# Patient Record
Sex: FEMALE
[De-identification: names, ages, dates, MRNs, and addresses within clinical notes are randomized per-mention and may not be internally consistent; named-entity substitution may affect disease eponyms.]

---

## 2018-12-06 NOTE — RAD
RADIOGRAPH CHEST 1 VIEW:

 

Date:  12/06/18 

Time:  1844 hours

 

HISTORY: 

62-year-old female with chest pain. 

 

FINDINGS:

There is cardiomegaly. There is no evidence of air space density, pulmonary edema, or pneumothorax. T
he lateral costophrenic angles are sharp.

 

There is no interval change in the intrathoracic contents compared to 04/05/18. 

 

IMPRESSION:

1.  No acute pulmonary findings.

2.  Cardiomegaly without congestive heart failure.

 

 

katherine [] 

 

POS: [facility]

## 2018-12-06 NOTE — PDOC.FPRHP
- History of Present Illness


Chief Complaint: Chest pain 


History of Present Illness: 





Ms. Russell presents to the ED with CP beginning 30 mins PTA. 





She reports that she was folding laundry when she began to feel a pulling 

sensation in her left chest area and arm pain. She has had pulling/cramping 

sensations in her chest before. She denies worsening shortness of breath or ANTUNEZ

, palpitations, new weakness/tingling, headache or vision changes. She had 

dialysis as scheduled today. She has seen Dr. Nova in the past without 

any positive stress or echos, she reports all of those tests were done approx 3-

4 years ago. 


ED Course: 





CBC, CMP, Trop, BNP, Lipase, CK, CXR, EKG 


Protonix, ASA, duoneb





- Allergies/Adverse Reactions


 Allergies











Allergy/AdvReac Type Severity Reaction Status Date / Time


 


adhesive Allergy  Rash Verified 12/07/18 02:04


 


Latex, Natural Rubber Allergy  Rash Verified 12/07/18 02:04


 


levofloxacin [From Levaquin] Allergy  Rash Verified 12/07/18 02:04


 


soap [From Betadine] Allergy  Rash Verified 12/07/18 02:04














- Home Medications


 











 Medication  Instructions  Recorded  Confirmed  Type


 


Atorvastatin Calcium [Lipitor] 80 mg PO HS 04/05/18 12/07/18 History


 


Clopidogrel Bisulfate [Plavix] 75 mg PO DAILY 04/05/18 12/07/18 History


 


Cyanocobalamin (Vitamin B-12) 1,000 mcg PO DAILY 04/05/18 12/07/18 History





[Vitamin B-12]    


 


Midodrine HCl [ProAmatine] 5 mg PO Q2DAYS 04/05/18 12/07/18 History


 


Montelukast Sodium [Singulair] 10 mg PO DAILY 04/05/18 12/07/18 History


 


Nortriptyline HCl [Pamelor] 50 mg PO HS PRN 04/05/18 12/07/18 History


 


Pregabalin [Lyrica] 50 mg PO TID 04/05/18 12/07/18 History


 


Primidone [Mysoline] 50 mg PO TID 04/05/18 12/07/18 History


 


predniSONE [Prednisone] 2.5 mg PO QAM 04/05/18 12/07/18 History


 


Budesonide 0.5 mg IH BID 08/30/18 12/07/18 History


 


Calcium Acetate 667 mg PO TID-WM 08/30/18 12/07/18 History


 


Acetaminophen [Tylenol Regular 650 mg PO Q4H PRN  tab 12/07/18  Rx





Strength]    


 


Budesonide-Formoterol [Symbicort 2 puff INH BID 12/07/18 12/07/18 History





160-4.5]    


 


Celecoxib 200 mg PO BID 12/07/18 12/07/18 History


 


Fluticasone Propionate [Flonase 1 spray EA NARE HS 12/07/18 12/07/18 History





Nasal Spray]    


 


Folic Acid 1 mg PO DAILY 12/07/18 12/07/18 History


 


Ipratropium/Albuterol Sulfate 3 ml NEB BID PRN 12/07/18 12/07/18 History


 


Lanthanum Carbonate [Fosrenol] 1,000 mg PO TID-WM 12/07/18 12/07/18 History














- History


PMHx: ESRD on dialysis, HTN, GERD, asthma 


 


PSHx: appy, choley, hernia repairx2, spleenectomy 





FHx: CA, DMII


 


Social: no TAD, nephew at bedside 


 








- Review of Systems


General: reports: fatigue.  denies: fever/chills, weight/appetite/sleep changes


Eyes: denies: eye pain, vision changes


ENT: denies: nasal congestion


Respiratory: reports: shortness of breath, exercise intolerance.  denies: cough

, congestion


Cardiovascular: reports: chest pain.  denies: palpitation, edema


Gastrointestinal: reports: constipation.  denies: nausea, vomiting, diarrhea


Skin: denies: rashes, lesions


Musculoskeletal: denies: pain, tenderness


Neurological: denies: numbness, syncope





- Vital signs


BP: 120/91  HR: 91 RR: 18 Tmax: 98.5 Pox: 97% on RA  Wt: 121.50   








- Physical Exam


Constitutional: NAD, awake, alert and oriented


HEENT: grossly normal vision, grossly normal hearing, MMM


Neck: supple, trachea midline


Chest: no lesions, other (tender to palpation over left chest)


Heart: RRR, normal S1/S2, no murmurs/rubs/gallops, pulses present, no edema, 

other (difficult to auscultate)


Lungs: CTAB, no respiratory distress, good air movement


Abdomen: soft, non-tender


Musculoskeletal: normal structure, normal tone


Neurological: no focal deficit


Skin: no rash/lesions, good turgor


Heme/Lymphatic: no unusual bruising or bleeding


Psychiatric: normal mood and affect





FMR H&P: Results





- Labs


Result Diagrams: 


 12/07/18 03:34





 12/07/18 03:34


Lab results: 


 











WBC  13.1 thou/uL (4.8-10.8)  H  12/06/18  18:58    


 


Hgb  15.9 g/dL (12.0-16.0)   12/06/18  18:58    


 


Hct  47.7 % (36.0-47.0)  H  12/06/18  18:58    


 


MCV  101.0 fL (78.0-98.0)  H  12/06/18  18:58    


 


Plt Count  365 thou/uL (130-400)   12/06/18  18:58    


 


Neutrophils %  66.7 % (42.0-75.0)   12/06/18  18:58    


 


Sodium  141 mmol/L (136-145)   12/06/18  18:58    


 


Potassium  4.3 mmol/L (3.5-5.1)   12/06/18  18:58    


 


Chloride  97 mmol/L ()  L  12/06/18  18:58    


 


Carbon Dioxide  32 mmol/L (23-31)  H  12/06/18  18:58    


 


BUN  19 mg/dL (9.8-20.1)   12/06/18  18:58    


 


Creatinine  4.44 mg/dL (0.6-1.1)  H  12/06/18  18:58    


 


Glucose  111 mg/dL ()   12/06/18  18:58    


 


Calcium  10.3 mg/dL (7.8-10.44)   12/06/18  18:58    


 


Total Bilirubin  0.5 mg/dL (0.2-1.2)   12/06/18  18:58    


 


AST  18 U/L (5-34)   12/06/18  18:58    


 


ALT  18 U/L (8-55)   12/06/18  18:58    


 


Alkaline Phosphatase  227 U/L ()  H  12/06/18  18:58    


 


Creatine Kinase  138 U/L ()   12/06/18  18:58    


 


Serum Total Protein  8.1 g/dL (6.0-8.3)   12/06/18  18:58    


 


Albumin  4.1 g/dL (3.4-4.8)   12/06/18  18:58    


 


Lipase  57 U/L (8-78)   12/06/18  18:58    














FMR H&P: A/P





- Problem List


(1) Atypical chest pain


Status: Resolved   Code(s): R07.89 - OTHER CHEST PAIN   





(2) Chronic obstructive asthma with exacerbation


Status: Acute   Code(s): J44.1 - CHRONIC OBSTRUCTIVE PULMONARY DISEASE W (ACUTE

) EXACERBATION; J45.901 - UNSPECIFIED ASTHMA WITH (ACUTE) EXACERBATION   





(3) ESRD (end stage renal disease) on dialysis


Status: Chronic   Code(s): N18.6 - END STAGE RENAL DISEASE; Z99.2 - DEPENDENCE 

ON RENAL DIALYSIS   





(4) Peripheral neuropathy


Status: Acute   Code(s): G62.9 - POLYNEUROPATHY, UNSPECIFIED   





(5) Physical deconditioning


Status: Acute   Code(s): R53.81 - OTHER MALAISE   





(6) Gastroesophageal reflux disease


Status: Chronic   Code(s): K21.9 - GASTRO-ESOPHAGEAL REFLUX DISEASE WITHOUT 

ESOPHAGITIS   


Qualifiers: 


   Esophagitis presence: esophagitis presence not specified   Qualified Code(s)

: K21.9 - Gastro-esophageal reflux disease without esophagitis   





(7) Hyperlipidemia


Status: Chronic   Code(s): E78.5 - HYPERLIPIDEMIA, UNSPECIFIED   





- Plan





Atypical chest pain 


- reproducible, tugging sensation, heart score 4 


- initial troponin neg, trendx3


- TTE and Stress in AM 


- NPO after midnight


Asthma


- possible cause of pain, continue home meds


- duonebs q4hr PRN


ESRD on dialysis


- nephrology consulted, appreciate Recs


- AM CMP 


peripheral neuropathy 


- continue home meds


GERD


- continue home meds


obesity hypoventilation syndrome


- aware, O2 monitoring, keep sats>88%


physical deconditioning


- aware, turn frequently 





Code: full 


ppx: [facility] 


Disposition/LOS: 





monitor on tele, stress/echo in AM 





FMR H&P: [place]





- Pertinent history





I agree completely with intern history. Only to add pt's chest pain resolved 

upon my examination. 





- Plan


Date/Time: 12/06/18 1957








I, Eddy Mota, have evaluated this patient and agree with findings/plan as 

outlined by intern resident. Pertinent changes/additions are listed here.








1. Atypical chest pain - Negative troponins, but concerning T-wave inversion in 

ED. Pain resolved with PPI and also reproducible on L chest. 


2. Asthma -possible cause of pain, continue home meds; duonebs q4hr PRN


3. ESRD on dialysis - Dr Cleveland nephrology consulted; did have HD today w/o 

complication. 


4. GERD - Pain did resolve with PPI. Continue PPI and avoid greasy/spicy foods. 


5. RACHID/Obesity hypoventilation syndrome; O2 monitoring, goal sats >88%


6. Physical deconditioning


7. Peripheral neuropathy - Continue Lyrica


8. HLD: Continue home meds





Attending Addendum





- Attending Addendum


Date/Time: 12/10/18 1023





I personally evaluated the patient and discussed the management with Dr. King 

at time of admission.


I agree with the History, Examination, Assessment and Plan documented above 

with any addition or exceptions noted below.

## 2018-12-06 NOTE — PDOC.EVN
Event Note





- Event Note


Event Note: 





Date/Time: 12/06/18 0940





I personally evaluated the patient and discussed the management with Dr. King.  

H&P pending.


I agree with the History, Examination, Assessment and Plan as discussed.

## 2018-12-07 NOTE — PDOC.FM
- Subjective


Subjective: 


Patient resting comfortably in bed. No overnight events. Patient states that 

she has not had a recurrence of chest pain. Patient denies SOB, diaphoresis, NVD

, fever/chills.





- Objective


Vital Signs & Weight: 


 Vital Signs (12 hours)











  Temp Pulse Resp BP Pulse Ox


 


 12/07/18 01:48  98 F  93  20  128/58 L  93 L








 Weight











Weight                         122.379 kg














I&O: 


 











 12/05/18 12/06/18 12/07/18





 06:59 06:59 06:59


 


Intake Total   0


 


Output Total   0


 


Balance   0











Result Diagrams: 


 12/07/18 03:34





 12/07/18 03:34





<Hillary Pardo - Last Filed: 12/07/18 08:06>





- Objective


Vital Signs & Weight: 


 Vital Signs (12 hours)











  Temp Pulse Resp BP Pulse Ox


 


 12/07/18 07:45  98.0 F  84  22 H  132/63  91 L


 


 12/07/18 06:55      91 L


 


 12/07/18 06:51   76  20  


 


 12/07/18 06:30   76  20  


 


 12/07/18 01:48  98 F  93  20  128/58 L  93 L








 Weight











Weight                         122.379 kg














I&O: 


 











 12/06/18 12/07/18 12/08/18





 06:59 06:59 06:59


 


Intake Total  0 


 


Output Total  0 


 


Balance  0 











Result Diagrams: 


 12/07/18 03:34





 12/07/18 03:34





<Lucio Riggs - Last Filed: 12/07/18 10:41>





Phys Exam





- Physical Examination


Constitutional: NAD


morbidly obese female


HEENT: PERRLA, moist MMs, sclera anicteric


Neck: supple, full ROM


Respiratory: clear to auscultation bilateral


Cardiovascular: RRR


chest non TTP


Gastrointestinal: soft, non-tender, positive bowel sounds


Musculoskeletal: no edema, pulses present


Neurological: non-focal


Psychiatric: normal affect, A&O x 3


Skin: no rash





<Hillary Pardo - Last Filed: 12/07/18 08:06>





Dx/Plan


(1) BMI 50.0-59.9, adult


Code(s): Z68.43 - BODY MASS INDEX (BMI) 50-59.9, ADULT   Status: Chronic   





(2) Benign hypertension


Code(s): I10 - ESSENTIAL (PRIMARY) HYPERTENSION   Status: Chronic   





(3) ESRD (end stage renal disease) on dialysis


Code(s): N18.6 - END STAGE RENAL DISEASE; Z99.2 - DEPENDENCE ON RENAL DIALYSIS 

  Status: Chronic   





(4) Gastroesophageal reflux disease


Code(s): K21.9 - GASTRO-ESOPHAGEAL REFLUX DISEASE WITHOUT ESOPHAGITIS   Status: 

Chronic   


Qualifiers: 


   Esophagitis presence: esophagitis presence not specified   Qualified Code(s)

: K21.9 - Gastro-esophageal reflux disease without esophagitis   





(5) History of splenectomy


Code(s): Z90.81 - ACQUIRED ABSENCE OF SPLEEN   Status: Chronic   





(6) Hyperlipidemia


Code(s): E78.5 - HYPERLIPIDEMIA, UNSPECIFIED   Status: Chronic   





(7) RACHID on CPAP


Code(s): G47.33 - OBSTRUCTIVE SLEEP APNEA (ADULT) (PEDIATRIC); Z99.89 - 

DEPENDENCE ON OTHER ENABLING MACHINES AND DEVICES   Status: Chronic   





(8) Atypical chest pain


Code(s): R07.89 - OTHER CHEST PAIN   Status: Resolved   





- Plan


Plan: 


Atypical chest pain, ACS r/o


- reproducible, tugging sensation, heart score 4 


- Trop neg


- TTE and Stress on 12/7


- NPO after midnight





Asthma


- possible cause of pain, continue home meds


- duonebs q4hr PRN





ESRD on dialysis


- nephrology consulted, appreciate Recs


- Cr. on arrival 4.44, on 12/7 5.29


- Pt got HD on 12/6


- AM CMP 





peripheral neuropathy 


- continue home meds





GERD


- continue home meds





obesity hypoventilation syndrome


- aware, O2 monitoring, keep sats>88%





physical deconditioning


- aware, turn frequently 





Code: full 


ppx: [facility] 


Disposition/LOS: 


monitor on tele, stress/echo 12/7 








<Hillary Pardo - Last Filed: 12/07/18 08:06>





Attending Addendum





- Attending Addendum


Date/Time: 12/07/18 1037





I personally evaluated the patient and discussed the management with Dr. Pardo.


I agree with the History, Examination, Assessment and Plan documented above 

with any addition or exceptions noted below.








62F with atypical chest pain here for ACS r/o. She had a negative cardiac work 

up several years ago with Cardiology. No cardiac issues until yesterday's 

symptoms. DM on HD. Stress test and TTE today. F/u results.





<Lucio Riggs - Last Filed: 12/07/18 10:41>

## 2018-12-07 NOTE — NM
MYOCARDIAL PERFUSION SCAN WITH SPECT IMAGING:

 

HISTORY:

Chest pain.

 

FINDINGS:

The examination was performed using 28 millicuries of 99m technetium sestamibi, as a stress only stud
y.

 

FINDINGS:

Fairly normal distribution of the radiopharmaceutical without signs of ischemia or scar.

 

WALL MOTION:

There is symmetric contractility to the ventricle.

 

LEFT VENTRICULAR EJECTION FRACTION:

The calculated left ventricular fracture is 76%.

 

IMPRESSION:

Unremarkable myocardial perfusion scan.

 

POS: [facility]

## 2018-12-09 NOTE — DIS
DATE OF ADMISSION:  12/06/2018



DATE OF DISCHARGE:  12/07/2018



RESIDENT:  Hillary Pardo MD



ADMITTING ATTENDING:  Dr. Madhu Ojeda.



DISCHARGE ATTENDING:  Dr. Lucio Riggs.



CONSULTS:  None.



PROCEDURES:  None.



PRIMARY DIAGNOSIS:  Atypical chest pain.



SECONDARY DIAGNOSES:  

1. Asthma.

2. End-stage renal disease, on dialysis.

3. Peripheral neuropathy.

4. Gastroesophageal reflux disease.

5. Obesity hypoventilation syndrome.

6. Physical deconditioning.



DISCHARGE MEDICATIONS:  

1. Acetaminophen 650 mg oral every 4 hours as needed.

2. Nortriptyline 50 mg oral at bedtime as needed.

3. Midodrine 5 mg oral every other day.

4. Lyrica 50 mg oral three times daily.

5. Montelukast 10 mg oral daily.

6. Cyanocobalamin 1000 mcg oral daily.

7. Plavix 75 mg oral daily.

8. Prednisone 2.5 mg oral every morning.

9. Lipitor 80 mg oral at bedtime.

10. Primidone 50 mg oral three times daily.

11. Budesonide 0.5 mg inhalation twice daily.

12. Calcium acetate 667 mg oral three times daily with meals.

13. Folic acid 1 mg oral daily.

14. Fluticasone propionate one spray each naris at bedtime.

15. Ipratropium/albuterol sulfate 3 mL nebulizer twice daily as needed.

16. Celecoxib 200 mg oral twice daily.

17. Lanthanum carbonate 1000 mg oral three times daily with meals.

18. Symbicort 2 puff inhalation twice daily.



DISCONTINUED MEDICATIONS:  None.



HISTORY OF PRESENT ILLNESS/HOSPITAL COURSE:  This is a 62-year-old female, who

presented to the ED with chest pain beginning 30 minutes prior to arrival while

folding laundry.  She described the pain as a point sensation in her left chest 
area

and arm pain.  The patient also states that she had crawling/cramping 
sensations in

her chest before.  The patient denied shortness of breath, palpitations, 
tingling,

headache, or vision changes.  The patient had dialysis earlier in the day.  The

patient has seen cardiologist, Dr. Ellis in the past without any positive 
stress

or echo.  These tests were done approximately 3 to 4 years ago.  The patient 
had a

HEART score of 4 on arrival.  The patient's troponins were negative x3.  The 
patient

was kept n.p.o. after midnight for stress test.  The patient had a stress test 
that

revealed an unremarkable myocardial perfusion scan.  The patient had an

echocardiogram performed that was unchanged from her previous echo in 2017. 

 The patient denied any other chest pain after leaving the ED.



DISPOSITION:  Stable.



LOCATION:  Home.



DIET:  Heart healthy.



ACTIVITY:  Ad-lety.



FOLLOWUP:  Follow up with PCP within 1 week.







Job ID:  [id number]



MTDD

## 2018-12-12 NOTE — STRESS
Acquisition Time: 2018-12-07  08:51:52

Total Exercise Time: 00:01:00

Test Indications: CHEST PAIN

Medications: 

 

 

 

 

 

 

 

 

 

Protocol:  LEXISCAN

Max HR: 093 BPM  59% of  Pred: 156 BPM

Max BP: 128/086 mmHG

Max Work Load: 1.0 METS

 

RESTING ECG: NORMAL SINUS RHYTHM AT 76 BPM WITH NONSPECIFIC T WAVES

SYMPTOMS: NAUSEA

NORMAL BP RESPONSE

ECTOPY STRESS: NONE

ECG STRESS: NO SIGNIFICANT CHANGES

INTERPRETATION: NEGATIVE ECG / AWAIT NUCLEAR IMAGES FOR DEFINITIVE DIAGNOSIS

 

Confirmed by CHERI MEI M.D. ([id number]) on 12/12/2018 5:42:19 PM

 

Referred By: DO IGNACIO           Confirmed By:CHERI MEI M.D.

## 2018-12-12 NOTE — EKG
Test Reason : 

Blood Pressure : ***/*** mmHG

Vent. Rate : 093 BPM     Atrial Rate : 093 BPM

   P-R Int : 190 ms          QRS Dur : 098 ms

    QT Int : 380 ms       P-R-T Axes : 033 -08 091 degrees

   QTc Int : 472 ms

 

Normal sinus rhythm

Abnormal QRS-T angle, consider primary T wave abnormality



Abnormal ECG

 

Confirmed by JANE RAMIREZ, MAYA COY ([id number]),  BOBBI MURILLO ([id number]) on 12/12/2018 2:41:13 PM

 

Referred By:             Confirmed By:MAYA LARA MD

## 2019-01-17 NOTE — CT
CT PULMONARY ANGIOGRAM WITH IV CONTRAST AND 3D MIP RECONSTRUCTIONS:

 

01/17/2019

 

PROVIDED CLINICAL HISTORY:

Dyspnea.

 

COMPARISON:

07/29/2017

 

FINDINGS:

There is suboptimal opacification of the pulmonary arterial system, on the basis of bolus timing issu
es.  There is no evidence for central pulmonary embolus.  The more peripheral pulmonary arterial syst
em is not sufficiently opacified for comment.  Vascular calcifications, including coronary calcium an
d prominent mitral annular calcification, are again seen.  There is an enlarged prevascular lymph nod
e with central diminished attenuation, suggesting necrosis, measuring about 1.8 cm in short axis.  No
 additional thoracic lymph node enlargement is apparent.

 

The lungs are free of significant opacity.  Bilateral shoulder arthropathy partially visualized.  The
 airway appears patent and of normal caliber.  There is no pleural fluid or pneumothorax apparent.

 

The visualized portions of the upper abdomen demonstrate no acute abnormality.

 

The osseous structures demonstrate no concerning osteoblastic or osteolytic lesions.

 

IMPRESSION:

1.  No evidence for central pulmonary embolus, with limitations in evaluating the more peripheral pul
monary arterial system due to insufficient contrast material.

 

2.  Vascular calcification, including coronary calcium.

 

3.  Enlarged prevascular lymph node with evidence for central low attenuation that may reflect necros
is.  Malignancy is not excluded.

 

POS: [facility]

## 2019-01-17 NOTE — RAD
CHEST 1 VIEW:

 

Date:  01/17/19 

 

HISTORY:  

Dyspnea. Shortness of breath prior to dialysis. 

 

COMPARISON:  

12/06/18. 

 

FINDINGS:

Mild bilateral vascular congestion and costophrenic angle blunting, stable. Mild cardiomegaly. No con
fluent pneumonia, overt edema, or other acute process. 

 

IMPRESSION: 

Stable cardiomegaly and vascular congestion. No new process. 

 

 

POS: [facility]

## 2019-06-17 NOTE — RAD
Two-view chest:



HISTORY: Dyspnea



COMPARISON: 10/27/2017



FINDINGS: Cardiomegaly with vascular congestion. Interstitial and hazy alveolar edema. Normal small e
ffusions.



IMPRESSION: Congestive findings as described. Not significantly changed from prior exam.



Reported By: Huber Arce 

Electronically Signed:  6/17/2019 9:33 AM

## 2019-06-21 NOTE — CT
CT OF THE CHEST PERFORMED WITHOUT CONTRAST ENHANCEMENT:

 

HISTORY: 

Followup of enlarged mediastinal lymph node.

 

COMPARISON: 

1/17/2019, 7/29/2017, and 9/10/2014 exams.  

 

FINDINGS: 

The lungs show a slightly mosaic lung pattern on this examination with areas of ground-glass opacity 
and hypoattenuation.  This may be an indication of some air trapping.  There is no focal infiltrative
 process.  There is a subtle area of tree-in-bud nodularity seen within the left lower lobe on axial 
image 33 which could indicate some minimal pneumonitis-type change.  

 

The enlarged prevascular lymph node is again demonstrated.  On the prior examination, it measured 18 
mm in short axis dimension and appears essentially unchanged measuring 17 mm on today's study.  In re
viewing the older studies, this has shown a definite progression of change in size.  In reviewing the
 earliest exam, the density was barely perceptible, then increased on the 2017 study to 10-11 mm and 
now is at the current measurement.  There is a second small prevascular node with a fatty hilum also 
seen measuring 10 mm in size.  There are calcified right hilar and subcarinal lymph nodes.

 

Visualized liver parenchyma shows no focal findings.  Kidneys show severe cortical thinning with mult
iple hypodensities most compatible with cysts.  The gallbladder has been removed.

 

IMPRESSION: 

1.  Somewhat mosaic lung pattern which is a nonspecific findings.  It could be an indication of some 
element of air trapping.

 

2.  Small focus of tree-in-bud nodularity in the left lower lobe which could indicate pneumonitis.

 

3.  Stable size to the prevascular lymph node.

 

4.  Thyroid gland with multiple nodules partially visualized.

 

POS: [facility]

## 2020-01-06 NOTE — ULT
EXAM:

Bilateral lower extremity venous Doppler



HISTORY:

Bilateral lower extremity pitting edema.



FINDINGS:

Grayscale, color-flow, Doppler evaluation, spectral analysis of the bilateral lower extremities venou
s structures is performed with 2-D imaging. The bilateral common femoral, superficial femoral,

popliteal, posterior tibial, proximal greater saphenous and profunda femoral veins are imaged.



The distal superficial femoral veins are not well visualized on grayscale imaging which limits evalua
tion for nonocclusive DVT. However, flow is demonstrated within these veins on color flow

evaluation. There is otherwise normal luminal compressibility, flow, and augmentation in the visualiz
ed deep venous structures of the bilateral lower extremities.



There is subcutaneous edema seen bilaterally.



IMPRESSION:

Limited evaluation of distal superficial femoral veins bilaterally on grayscale imaging which limits 
evaluation for nonocclusive DVT at these levels, but there is flow within these venous structures

without evidence of occlusive thrombus. There is otherwise no evidence of a deep vein thrombosis in t
he visualized deep venous structures bilateral lower extremities.



Reported By: Tyler Valdez 

Electronically Signed:  1/6/2020 10:52 PM

## 2020-01-06 NOTE — RAD
EXAM: 

CHEST ONE VIEW



HISTORY:

Cough and congestion.



COMPARISON:

6/17/2019



FINDINGS:

Cardiac silhouette remains in enlarged. There is prominence of perihilar interstitial densities which
 were also seen on the prior exam and could be related to an element of mild pulmonary edema. Right

hilar prominence is again seen. Question of blunting of the left lateral costophrenic angle, but this
 is probably due to overlying soft tissue density. There is resorption of the distal clavicles

bilaterally which is unchanged. There is stable dislocation of each humeral head.



IMPRESSION:



Cardiomegaly with perihilar interstitial prominence which may be related to an element of pulmonary e
thais. Slightly greater patchy density is seen in the right infrahilar region which could be related

to asymmetric pulmonary edema versus pneumonitis or atelectasis. Follow-up chest x-ray is recommended
.



Reported By: Tyler Valdez 

Electronically Signed:  1/6/2020 11:08 PM

## 2020-01-07 NOTE — RAD
EXAM:

XR Tib Fib Lt Leg 2 View



PROVIDED CLINICAL HISTORY:

Pain



FINDINGS:

There is no evidence for fracture or other acute osseous abnormality. Alignment appears anatomic. Vas
cular calcifications are seen. Degenerative changes are present at the knee.



IMPRESSION:

No evidence for an acute osseous abnormality. If there is persistent clinical concern, conservative m
anagement and follow-up imaging advised.



Reported By: Dane Joyce 

Electronically Signed:  1/7/2020 10:25 AM

## 2020-01-07 NOTE — RAD
LEFT ANKLE 3 VIEWS:

 

Date:  01/07/2020

 

HISTORY:  

Pain, bruising left ankle. 

 

FINDINGS/IMPRESSION: 

The ankle mortise is maintained. No acute fracture or dislocation is seen. There are posterior and pl
j carlos calcaneal spurs. Vascular calcifications are present. 

 

 

POS: OFF

## 2020-01-07 NOTE — CON
DATE OF CONSULTATION:  01/07/2020



HISTORY OF PRESENT ILLNESS:  Ms. Russell is a 64-year-old  female with 
known

history of ESRD - on maintenance hemodialysis, who was admitted for shortness of

breath.  She was noted to have exacerbation of her asthma.  We are being 
consulted

for management of her ESRD.  I have scheduled this patient for hemodialysis 
today.

Her breathing is actually much better this morning. 



REVIEW OF SYSTEMS:  Positive for shortness of breath.  No chest pain.  No 
syncopal

episode.  No nausea.  No vomiting.  No fever or chills.  No productive cough.  
No

gross hematuria.  No dysuria.  No urinary frequency.  No abdominal pain.  
Appetite

and energy level are fair.  No headache.  No diplopia.  No dysuria.  No

hematochezia.  No melena. 



HOME MEDICATIONS:  Include the following;

1. Lipitor 80 mg at bedtime.

2. Clopidogrel 75 mg once a day.

3. Vitamin B12 of 1000 mcg daily.

4. Midodrine 5 mg p.o. every other day during dialysis.

5. Singulair 10 mg daily.

6. Nortriptyline 50 mg at bedtime.

7. Lyrica 100 mg p.o. b.i.d.

8. Primidone 50 mg p.o. b.i.d.

9. Prednisone 2.5 mg q.a.m.

10. Budesonide inhaler as directed.

11. Calcium acetate 667 mg 1 tablet t.i.d. with meals.

12. Symbicort 2 puffs b.i.d.

13. Celebrex 200 mg daily.

14. Albuterol sulfate neb treatment q.i.d.

15. Lanthanum sulfate 1000 mg p.o. t.i.d. with meals.

16. ProAir 2 puffs q.4 hours as needed.

17. Protonix 40 mg tablet once a day.

18. Sevelamer 1600mg p.o. t.i.d. with meals.

19. Prednisone 10 mg daily.



PAST MEDICAL HISTORY:  

1. Obstructive sleep apnea.

2. Chronic asthma.

3. Hypertension.

4. ESRD from hypertensive nephropathy - on maintenance hemodialysis.

5. GERD.

6. Hyperlipidemia.

7. Hyperphosphatemia.



PAST SURGICAL HISTORY:  

1. Status post parathyroidectomy.

2. Status post cholecystectomy.

3. Status post hernia repair.

4. Status post splenectomy.

5. Status post AV fistula placement.

6. Status post carpal tunnel surgery.

7. Status post cuffed hemodialysis catheter placement.

8. Status post upper and lower GI endoscopy.



SOCIAL HISTORY:  The patient is single, lives with her niece.  She is a retired

[employer] employee.  Education, high school.  Lives in [place].  No alcohol

intake.  No smoking.  No children.  No drug abuse.  Sedentary lifestyle.  Status

post blood transfusion. 



ALLERGIES:  NONE.



TRAUMA:  None.



IMMUNIZATIONS:  Up to date.



HOSPITALIZATIONS:  Please see past medical history.



FAMILY HISTORY:  Positive family history of ESRD.



PHYSICAL EXAMINATION:

VITAL SIGNS:  Blood pressure is noted at 137/62, heart rate 77, respiratory 
rate 16,

temperature 98.6, and pulse ox 99%. 

GENERAL:  Awake, alert, obese, comfortable, not in distress. 

SKIN:  Adequate turgor. 

HEENT:  She has pinkish conjunctivae.  Anicteric sclerae.  No neck mass.  No 
carotid

bruits.  No JVD. 

CHEST:  No deformities. 

LUNGS:  Clear breath sounds.  No wheezing.  No crackles. 

HEART:  Normal sinus rhythm.  No murmur.  No gallops.  No rubs. 

ABDOMEN:  Globular, soft, nontender.  No masses. 

EXTREMITIES:  No edema. 

NEUROLOGICAL:  Awake, oriented to 3 spheres.  Moving all extremities.  No 
tremors.

No asterixis. 



LABORATORY DATA:  Laboratories of January 7, 2020; white count 15.6, hemoglobin

11.9.  Sodium 140, potassium 5.4, chloride 98, carbon dioxide 27, BUN 41, 
creatinine

9.45, glucose 165, and calcium 8.3. 



IMAGING DATA:  On January 6, 2020; chest x-ray shows perihilar interstitial

prominence, ? of CHF. 



ASSESSMENT AND PLAN:  

1. End-stage renal disease - we will schedule the patient for her regular

hemodialysis.  The plan is to do a 4-hour hemodialysis.  We will max out fluid

removal only as tolerated by the patient. Review of her last kt/V at the 
outpatient hemodialysis suggest she

is adequately dialyzed with the current hemodialysis regimen.



2. Asthma exacerbation, clinically much improved.  Continue supportive care. 

Agree with current management.



3. Hyperphosphatemia - On Renvela.







Job ID:  [id number]



[facility]

## 2020-01-07 NOTE — PDOC.FPRHP
- History of Present Illness


Chief Complaint: Shortness of breath


History of Present Illness: 





Lori is a 64yoF with a significant PMH of persistent asthma, ESRD on HD, HTN, 

RACHID, and morbid obesity who presents to the ED for symptoms of increasing 

shortness of breath. She states that she used to have frequent asthma 

exacerbations and hospitalizations, however she has been doing well in the last 

year. She states that any time she gets an upper respiratory infection or cold 

her asthma acts up, she is usually able to manage this at home with nebulizer 

treatments and increasing her steroid dose. However, this time she was still 

becoming progressively more short of breath despite her usual measures. She 

denies fever. 





She also complains of LLE pain. She recalls slipping while getting out of her 

vehicle last week and believes she may have twisted her ankle at that time. 





ED Course: 


Mag sulfate, Decadron, Albuterol, Duoneb, Vanc and zosyn 





- Allergies/Adverse Reactions


 Allergies











Allergy/AdvReac Type Severity Reaction Status Date / Time


 


adhesive Allergy  Rash Verified 12/07/18 02:04


 


Latex, Natural Rubber Allergy  Rash Verified 12/07/18 02:04


 


levofloxacin [From Levaquin] Allergy  Rash Verified 12/07/18 02:04


 


soap [From Betadine] Allergy  Rash Verified 12/07/18 02:04














- Home Medications


 











 Medication  Instructions  Recorded  Confirmed  Type


 


Atorvastatin Calcium [Lipitor] 80 mg PO HS 04/05/18 01/07/20 History


 


Clopidogrel Bisulfate [Plavix] 75 mg PO DAILY 04/05/18 01/07/20 History


 


Cyanocobalamin (Vitamin B-12) 1,000 mcg PO DAILY 04/05/18 01/07/20 History





[Vitamin B-12]    


 


Midodrine HCl [ProAmatine] 5 mg PO Q2DAYS 04/05/18 01/07/20 History


 


Montelukast Sodium [Singulair] 10 mg PO DAILY 04/05/18 01/07/20 History


 


Nortriptyline HCl [Pamelor] 50 mg PO HS 04/05/18 01/07/20 History


 


Pregabalin [Lyrica] 100 mg PO BID 04/05/18 01/07/20 History


 


Primidone [Mysoline] 50 mg PO BID 04/05/18 01/07/20 History


 


predniSONE [Prednisone] 2.5 mg PO QAM 04/05/18 01/07/20 History


 


Budesonide 0.5 mg IH DAILY 08/30/18 01/07/20 History


 


Calcium Acetate 667 mg PO TID- 08/30/18 01/07/20 History


 


Acetaminophen [Tylenol Regular 650 mg PO Q4H PRN  tab 12/07/18 01/07/20 Rx





Strength]    


 


Budesonide-Formoterol [Symbicort 2 puff INH BID 12/07/18 01/07/20 History





160-4.5]    


 


Celecoxib 200 mg PO DAILY-AC 12/07/18 01/07/20 History


 


Fluticasone Propionate [Flonase 1 spray EA NARE HS 12/07/18 01/07/20 History





Nasal Spray]    


 


Folic Acid 1 mg PO DAILY 12/07/18 01/07/20 History


 


Ipratropium/Albuterol Sulfate 3 ml NEB BID PRN 12/07/18 01/07/20 History


 


Lanthanum Carbonate [Fosrenol] 1,000 mg PO TID- 12/07/18 01/07/20 History


 


Albuterol Sulfate [Proair HFA] 2 puff INH Q4HR PRN 01/07/20 01/07/20 History


 


Cinacalcet HCl [Sensipar] 60 mg PO DAILY 01/07/20 01/07/20 History


 


Loratadine [Claritin] 10 mg PO DAILY PRN 01/07/20 01/07/20 History


 


Nystatin [Nystop] 1 applic TOP BID 01/07/20 01/07/20 History


 


Pantoprazole [Protonix] 40 mg PO DAILY 01/07/20 01/07/20 History


 


Sevelamer Carbonate 2.4 gm PO TID- 01/07/20 01/07/20 History


 


predniSONE [Prednisone] 10 mg PO DAILY 01/07/20 01/07/20 History














- History


PMHx:


   HTN


   ESRD on HD T/Th/S - Nephrologist is Dr. Cristofer Johnson


   RACHID


   GERD


   HLD   


 


PSHx: 


   Cholecystectomy


   Hernia repair


   Splenectomy


   L arm fistula


   Carpal tunnel surgery


   Parathyroidectomy





FHx:


   Non contributory


 


Social:


   Denies alcohol, tobacco or illicit drug use


 








- Review of Systems


General: denies: fever/chills, weight/appetite/sleep changes, night sweats


Eyes: denies: eye pain, vision changes


ENT: reports: nasal congestion, rhinorrhea


Respiratory: reports: cough, congestion, shortness of breath, exercise 

intolerance


Cardiovascular: reports: edema.  denies: chest pain, palpitation


Gastrointestinal: denies: nausea, vomiting, diarrhea, constipation, abdominal 

pain


Genitourinary: denies: incontinence, dysuria, polyuria


Skin: denies: rashes, lesions


Musculoskeletal: reports: pain, tenderness, swelling.  denies: stiffness


Neurological: denies: numbness, syncope, seizure





- Vital signs


BP: 150/77, MAP: 101, Pulse: 77, Resp: 17, Temp: 98.3 (Oral), Pain: 0, O2 sat: 

97 on (Room Air), Time: 1/6/2020 23:17. Weight 125kg





- Physical Exam


Constitutional: NAD, awake, alert and oriented, well developed


HEENT: normocephalic and atraumatic, PERRLA, EOMI, conjunctiva clear, grossly 

normal vision, grossly normal hearing, MMM


Neck: supple


Heart: RRR, normal S1/S2, no murmurs/rubs/gallops, pulses present, other (1+ 

pitting edema BLE)


Lungs: no respiratory distress


-Lungs: 


Tight breath sounds, poor air movement with diffuse wheezing throughout.


Abdomen: soft, non-tender


Musculoskeletal: normal structure, normal tone


-Musculoskeletal: 


Area of significant bruising on the left lateral ankle 


Neurological: no focal deficit, CN II-XII intact


Skin: no rash/lesions, good turgor


Heme/Lymphatic: no purpura, no petechia


Psychiatric: normal mood and affect, good judgment and insight, intact recent 

and remote memory





FMR H&P: Results





- Labs


Result Diagrams: 


 01/07/20 03:51





 01/07/20 03:51


Lab results: 


 











WBC  17.1 thou/uL (4.8-10.8)  H  01/06/20  19:46    


 


Hgb  12.5 g/dL (12.0-16.0)   01/06/20  19:46    


 


Hct  38.8 % (36.0-47.0)   01/06/20  19:46    


 


MCV  99.1 fL (78.0-98.0)  H  01/06/20  19:46    


 


Plt Count  319 thou/uL (130-400)   01/06/20  19:46    


 


Neutrophils %  76.6 % (42.0-75.0)  H  01/06/20  19:46    


 


ABG pH  7.41  (7.35-7.45)   01/06/20  21:08    


 


ABG pCO2  49.2 mmHg (35.0-45.0)  H  01/06/20  21:08    


 


ABG pO2  60.0 mmHg (> 80.0)   01/06/20  21:08    


 


Sodium  141 mmol/L (136-145)   01/06/20  19:46    


 


Potassium  5.2 mmol/L (3.5-5.1)  H  01/06/20  19:46    


 


Chloride  98 mmol/L ()   01/06/20  19:46    


 


Carbon Dioxide  33 mmol/L (23-31)  H  01/06/20  19:46    


 


BUN  34 mg/dL (9.8-20.1)  H  01/06/20  19:46    


 


Creatinine  8.54 mg/dL (0.6-1.1)  H  01/06/20  19:46    


 


Glucose  97 mg/dL ()   01/06/20  19:46    


 


Lactic Acid  1.3 mmol/L (0.5-2.2)   01/06/20  21:02    


 


Calcium  8.6 mg/dL (7.8-10.44)   01/06/20  19:46    


 


Total Bilirubin  0.7 mg/dL (0.2-1.2)   01/06/20  19:46    


 


AST  14 U/L (5-34)   01/06/20  19:46    


 


ALT  15 U/L (8-55)   01/06/20  19:46    


 


Alkaline Phosphatase  109 U/L ()   01/06/20  19:46    


 


B-Natriuretic Peptide  277.7 pg/mL (0-100)  H  01/06/20  19:46    


 


Serum Total Protein  7.4 g/dL (6.0-8.3)   01/06/20  19:46    


 


Albumin  4.2 g/dL (3.4-4.8)   01/06/20  19:46    














- EKG Interpretation


EKG: 


NSR   





- Radiology Interpretation


  ** Chest x-ray


Status: report reviewed by me (IMPRESSION:   Cardiomegaly with perihilar 

interstitial prominence which may be related to an element of pulmonary e thais. 

Slightly greater patchy density is seen in the right infrahilar region which 

could be related to asymmetric pulmonary edema versus pneumonitis or 

atelectasis. Follow-up chest x-ray is recommended)





  ** US - venous


Status: report reviewed by me (IMPRESSION:  Limited evaluation of distal 

superficial femoral veins bilaterally on grayscale imaging which limits 

evaluation for nonocclusive DVT at these levels, but there is flow within these 

venous structures without evidence of occlusive thrombus. There is otherwise no 

evidence of a deep vein thrombosis in the visualized deep venous structures 

bilateral lower extremities.)





FMR H&P: A/P





- Problem List


(1) Anemia in chronic kidney disease (CKD)


Current Visit: No   Status: Acute   Code(s): N18.9 - CHRONIC KIDNEY DISEASE, 

UNSPECIFIED; D63.1 - ANEMIA IN CHRONIC KIDNEY DISEASE   





(2) Chronic obstructive asthma with exacerbation


Current Visit: No   Status: Acute   Code(s): J44.1 - CHRONIC OBSTRUCTIVE 

PULMONARY DISEASE W (ACUTE) EXACERBATION; J45.901 - UNSPECIFIED ASTHMA WITH (

ACUTE) EXACERBATION   





(3) Elevated brain natriuretic peptide (BNP) level


Current Visit: No   Status: Acute   Code(s): R79.89 - OTHER SPECIFIED ABNORMAL 

FINDINGS OF BLOOD CHEMISTRY   





(4) Physical deconditioning


Current Visit: No   Status: Acute   Code(s): R53.81 - OTHER MALAISE   





(5) BMI 50.0-59.9, adult


Current Visit: No   Status: Chronic   Code(s): Z68.43 - BODY MASS INDEX (BMI) 

50.0-59.9, ADULT   





(6) ESRD (end stage renal disease) on dialysis


Current Visit: No   Status: Chronic   Code(s): N18.6 - END STAGE RENAL DISEASE; 

Z99.2 - DEPENDENCE ON RENAL DIALYSIS   





(7) Gastroesophageal reflux disease


Current Visit: No   Status: Chronic   Code(s): K21.9 - GASTRO-ESOPHAGEAL REFLUX 

DISEASE WITHOUT ESOPHAGITIS   


Qualifiers: 


   Esophagitis presence: esophagitis presence not specified   Qualified Code(s)

: K21.9 - Gastro-esophageal reflux disease without esophagitis   





(8) RACHID on CPAP


Current Visit: No   Status: Chronic   Code(s): G47.33 - OBSTRUCTIVE SLEEP APNEA 

(ADULT) (PEDIATRIC); Z99.89 - DEPENDENCE ON OTHER ENABLING MACHINES AND DEVICES

   





- Plan





Acute asthma exacerbation


-History of severe persistent asthma. Will admit for obs with duoneb scheduled 

q3hr and albuterol nebs q2hr prn. 


-Will start steroids at 40mg daily and plan for taper on discharge


-Chest x-ray suggests patchy density in the right infrahilar region, will plan 

for f/u x-ray to monitor for improvement. 


-Despite elevated white count, which is likely due to steroids, patient does 

not appear to have infectious process. Will discontinue antibiotic therapy and 

monitor. 


-Negative for influenza





ESRD on HD (T/Th/S) 


-Dr. Cleveland is her nephrologist. Will consult him and plan for dialysis tomorrow. 


-Continue home meds


-on Midodrine for BP around dialysis, has hypotension with HD. 





LLE pain and bruising


-XR of ankle and tib fib ordered


-Venous dopplers ruled out DVT 





RACHID / obesity hypoventilation syndrome


-use home CPAP/settings





HFpEF


-Echo 12/2018 EF 55-60%


-Suggested diastolic dysfunction





Anemia of chronic disease


-continue home medications/supplements 





Elevated BNP


-277. 


-Does not appear to be clinically fluid overloaded, will monitor. 





GERD


-Continue home medications





Intention tremor


-continue home medications





Deconditioning 


-Patient does not walk much at home 





H/o HTN


-not currently on medication, will monitor pressures.





























Disposition/LOS: 





Code: Cardiac only, DNI


VTE: SCDs


Dispo: Stable, observation status. Likely LOS < 48 hours. 








FMR H&P: Upper Level





- Pertinent history








Lori is a 64 year old female with PMH significant for persistent asthma on 

chronic steroids, ESRD on HD T, TH, Sat, HTN, RACHID, and Obesity hypoventilation 

syndrome that presents to the ED with nonproductive cough for the last 3 days. 

She was seen in urgent care where a flu swab was performed and noted to be 

negative. She has had worsening shortness of breath associated with cough since 

that time. She has also had swelling of the left ankle/leg and was concerned 

about a potential clot. Patient denies fever, chills, chest pain, body aches. 

She states that any time she gets an upper respiratory infection her asthma 

flares. She has been on 2.5 mg of steroids daily per Dr. Figueroa, her 

Pulmonologist, for persistent asthma. She was given 20 mg prednisone tablets 

when exacerbations arise. She took one dose of 20 mg prednisone yesterday as 

she could tell her asthma was flaring. She is usually able to manage her asthma 

at home with increased dose of steroids and nebulizers, but despite her usual 

measures she was still feeling significantly short of breath. Patient does have 

history of hospitalizations requiring intubation for asthma; however, she has 

not needed evaluation in hospital for the last year. 





In regards to left leg swelling, patient reports this started a few days ago. 

She does not recall any specific twisting of ankle or fall, but she does note 

she slipped down from her vehicle while trying to transfer to her wheelchair, 

and states that possibly she twisted her ankle at that time but did not realize 

it. 








- Pertinent findings





General: Alert and oriented x3. No acute distress. 


HEENT: MMM


Card: 3/6 systolic murmur, RRR


Resp: Decreased breath sounds throughout, minimal expiratory wheezes. 


Abdomen: Obese, soft, non-tender


Ext: LLE with ecchymosis and tenderness of left lateral malleolus, 2+ pitting 

edema of bilateral LE's





- Plan


Date/Time: 01/07/20 0006








IShelbie, have evaluated this patient and agree with findings/plan 

as outlined by intern resident. Pertinent changes/additions are listed here.





Acute asthma exacerbation


- CXR: Possible RLL infiltrate, bilateral haziness w/ possible pulmonary 

congestion


- Symptoms improved with nebulizer treatments


- Continue Duonebs scheduled q3h, PRN albuterol available between scheduled 

duonebs; space out scheduled duonebs as patient's clinical status improves


- Will start patient on 40 mg prednisone x5 and send home with taper


- Respiratory status stable; not requiring supplemental O2 or BiPAP, no 

apparent respiratory distress


- Patient given Vanc and Zosyn in ED; unclear why. Will hold off on antibiotics 

at this time and evaluate clinically for changes suspicious for pneumonia. Will 

repeat CXR as recommended by radiologist to reassess for infiltrate in right 

perihilar region. 


- 


- Blood cultures obtained in ED


- Influenza neg; given history of exacerbations in setting of URI, suspect URI 

contributing


- ABG 7.41/49/60





Leukocytosis


- WBC 17.1


- No left shift, suspect 2/2 chronic steroid use


- Will monitor for signs symptoms of infection





Left ankle swelling and pain


- Suspect trauma 


- Xray tib/fib and left ankle pending


- LE dopplers neg for DVT





RACHID and obesity hypoventilation syndrome


- Continue CPAP at night or when sleeping as patient does at home





ESRD on HD (T/Th/S) 


- Consult Dr. Cleveland in AM for dialysis


- Continue home meds





HFpEF


- Echo 12/2018 EF 55-60%


- Suggested diastolic dysfunction


- Gentle on fluids if they are needed


- 





Anemia of chronic disease


- Continue home medications/supplements 





GERD


- Continue home medications





Essential tremor


- continue home medications





Deconditioning 


- Patient does not walk much at home; she performs her own transfers and 

travels very short distances





H/o HTN


- Not currently on medication, will monitor pressures and add medication if 

necessary





DVT PPX: SCD's (will avoid left leg if painful)


Code status: DNI; cardiac only





Dispo: Obs on medical. Anticipate LOS <48 hours. 





Addendum - Attending





- Attending Attestation


Date/Time: 01/07/20 0612





I personally evaluated the patient and discussed the management with Dr. Bergeron 

on 1/6/2020


I agree with the History, Examination, Assessment and Plan documented above 

with any addition or exceptions noted below - 63 yo female with h/o asthma on 

chronic steroids, ESRD on HD, HTN, RACHID presents c/o SOB x 3 days. Has been 

taking her medications as directed and increased steroid dose without 

improvement in symptoms. Does report a cough that is non- productive. Denies 

any fever/chills or ill contacts. Denies any sore throat or nasal congestion. 

Denies any CP. Has been attending dialysis as scheduled. PMH/PSH/Meds/SH 

reviewed and agree with resident's documentation. Afebrile P82  RR22  /83

  98% on RA Exam repeated by me and agree with resident's findings. Labs: WBC=

17.1, H/H=12.5/38.8, Odw=271, lactic acid=1.3, MSP=699, trop I < 0.010, CXR- 

negative. A/P: 1) Acute asthma exacerbation - continue scheduled duonebs, 

continue steroids. 2) ESRD- will contact nephrology to schedule HD 3) HTN- 

continue home meds. 4) Left ankle ecchymosis- will check x-ray to evaluate for 

occult fracture.

## 2020-01-07 NOTE — PDOC.FM
- Subjective


Subjective: 





Pt is feeling improved from yesterday, still has some intermittent SOB. No 

other complaints





no fever/chills, SOB, no cp





- Objective


Vital Signs & Weight: 


 Vital Signs (12 hours)











  Temp Pulse Resp BP Pulse Ox


 


 01/07/20 06:39   74  18   96


 


 01/07/20 04:20  98.6 F  77  16  137/62  99


 


 01/07/20 04:01   77  16   99


 


 01/07/20 01:12   81  16   99


 


 01/07/20 00:25  99.2 F  81  22 H  137/83  98








 Weight











Weight                         123.462 kg














I&O: 


 











 01/06/20 01/07/20 01/08/20





 06:59 06:59 06:59


 


Intake Total  720 


 


Balance  720 











Result Diagrams: 


 01/07/20 03:51





 01/07/20 03:51





Phys Exam





- Physical Examination


Constitutional: NAD


HEENT: moist MMs, sclera anicteric


Neck: supple, full ROM


Respiratory: no rales


bilat expiratory wheezing


Cardiovascular: RRR, no significant murmur


Gastrointestinal: soft, non-tender


Musculoskeletal: no edema, pulses present


L ankle ttp in posteriolateral aspect


Neurological: non-focal, normal sensation


Psychiatric: normal affect, A&O x 3


Skin: no rash, normal turgor





Dx/Plan


(1) Chronic obstructive asthma with exacerbation


Code(s): J44.1 - CHRONIC OBSTRUCTIVE PULMONARY DISEASE W (ACUTE) EXACERBATION; 

J45.901 - UNSPECIFIED ASTHMA WITH (ACUTE) EXACERBATION   Status: Acute   





(2) Elevated brain natriuretic peptide (BNP) level


Code(s): R79.89 - OTHER SPECIFIED ABNORMAL FINDINGS OF BLOOD CHEMISTRY   Status

: Acute   





(3) Asthma


Code(s): J45.909 - UNSPECIFIED ASTHMA, UNCOMPLICATED   Status: Chronic   





(4) BMI 50.0-59.9, adult


Code(s): Z68.43 - BODY MASS INDEX (BMI) 50.0-59.9, ADULT   Status: Chronic   





(5) CHF (congestive heart failure)


Code(s): I50.9 - HEART FAILURE, UNSPECIFIED   Status: Chronic   





(6) ESRD (end stage renal disease) on dialysis


Code(s): N18.6 - END STAGE RENAL DISEASE; Z99.2 - DEPENDENCE ON RENAL DIALYSIS 

  Status: Chronic   





(7) Gastroesophageal reflux disease


Code(s): K21.9 - GASTRO-ESOPHAGEAL REFLUX DISEASE WITHOUT ESOPHAGITIS   Status: 

Chronic   


Qualifiers: 


   Esophagitis presence: esophagitis presence not specified   Qualified Code(s)

: K21.9 - Gastro-esophageal reflux disease without esophagitis   





(8) Obstructive sleep apnea


Code(s): G47.33 - OBSTRUCTIVE SLEEP APNEA (ADULT) (PEDIATRIC)   Status: Chronic

   





- Plan


Plan: 





Acute asthma exacerbation


A- History of severe persistent asthma.admitted for obs with duoneb scheduled 

q3hr and albuterol nebs q2hr prn. She seems to be slowly improving. Chest x-ray 

suggests patchy density in the right infrahilar region. possible pneumonitis. 

Negative for influenza


P- continue duonebs


-continue prednisone


-f/u x-ray to monitor for improvement of patchy infiltrate  





ESRD on HD (T/Th/S) 


A-on Midodrine for BP around dialysis, has hypotension with HD. 


P- continue dialysis





LLE pain and bruising


A- XR of ankle and tib fib ordered. Venous dopplers ruled out DVT 


P- f/u imaging





RACHID / obesity hypoventilation syndrome


-use home CPAP/settings





HFpEF


A- Echo 12/2018 EF 55-60%. Suggested diastolic dysfunction


P- will be mindful of fluids





Anemia of chronic disease


-continue home medications/supplements 





Elevated BNP


-277. Does not appear to be clinically fluid overloaded, will monitor. 





GERD


-Continue home medications





Intention tremor


-continue home medications





Deconditioning 


-Patient does not walk much at home, walking program





H/o HTN


-not currently on medication, will monitor pressures.











Code: Cardiac only, DNI





Addendum - Attending





- Attending Attestation


Date/Time: 01/07/20 4642





I personally evaluated the patient and discussed the management with the team.


I agree with the History, Examination, Assessment and Plan documented above 

with any addition or exceptions noted below.





No fever, chills, nonproductive cough.  She has bibasilar crackles and poor air 

movement with expiratory wheeze.  I feel mainly an asthma exacerbation.  Will 

consult Dr. Figueroa, her primary pulmonologist.

## 2020-01-08 NOTE — PRG
DATE OF SERVICE:  01/08/2020



SUBJECTIVE:  Ms. Russell is a 64-year-old  female with ESRD-on maintenance

hemodialysis and was admitted for shortness of breath from asthma exacerbation.  She

did undergo dialysis yesterday and developed cramping episodes after dialysis.  This

may be reflection of the fluid removal.  We will adjust fluid removal with dialysis.

 No new complaints today.  Still breathing is actually improved from last time.  No

complaints of chest pain. 



OBJECTIVE:  VITAL SIGNS:  Blood pressure is 131/62, heart rate 64, respiratory rate

20, temperature 98.1, pulse ox 98%. 

GENERAL:  Awake, obese, comfortable, not in overt distress. 

SKIN:  Adequate turgor. 

HEENT:  Pinkish conjunctivae.  Anicteric sclerae. 

NECK:  No neck mass.  No carotid bruits.  No JVD. 

CHEST:  No deformities. 

LUNGS: Clear breath sounds. 

HEART:  Normal sinus rhythm.  No murmur.  No gallops.  No rubs. 

ABDOMEN:  Globular, soft, nontender.  No masses. 

EXTREMITIES:  No edema.  No deformities.



MEDICATIONS:  Medications of January 2020 was reviewed.



LABORATORY DATA:  Laboratories of January 8, 2020, white count 14.4, hemoglobin

11.6.  Sodium 136, potassium 4.4, chloride 96, carbon dioxide 28, BUN 29, creatinine

6.38, glucose 115, calcium 8.0. 



ASSESSMENT AND PLAN:  

1. End-stage renal disease, stable, tolerating hemodialysis regimen.  Adjust fluid

removal tomorrow if she is still here.  She did develop a cramping episode after her

dialysis. 

2. Shortness of breath-asthma exacerbation.  Clinically improving.  Pulmonary

following. 

3. Chronic anemia.  No indication for any Epogen at the present time.  Overall,

agree with current management. 







Job ID:  [id number]

## 2020-01-08 NOTE — RAD
Chest one view



HISTORY: Dyspnea.



COMPARISON: 1/6/2020.



FINDINGS: Cardiac silhouette is magnified and enlarged. Pulmonary vasculature are less engorged than 
on the prior study. Bilateral perihilar infiltrates are also less dense. Mediastinum is midline

with aortic calcification. No evidence of pneumothorax.











IMPRESSION: Interval decrease in radiographic severity of pulmonary vascular congestion and bilateral
 perihilar infiltrates.



Other findings are stable.



Reported By: VIJAY Carolina 

Electronically Signed:  1/8/2020 7:35 AM

## 2020-01-08 NOTE — PDOC.FM
- Subjective


Subjective: 





pt feeling improved, feels she might be able to go home later today if she 

continues to feel better, she has persistent cough. Reports having 4L pulled 

off at dialysis yesterday.





- Objective


Vital Signs & Weight: 


 Vital Signs (12 hours)











  Temp Pulse Resp BP Pulse Ox


 


 01/08/20 07:55  98.1 F  64  20  131/62  98


 


 01/08/20 04:00  98.1 F  68  16  121/59 L  90 L


 


 01/08/20 03:47   70  16   90 L


 


 01/08/20 00:33   73  20   97


 


 01/08/20 00:00  98.6 F  73  16  147/63 H  92 L


 


 01/07/20 22:06   68  20   95








 Weight











Weight                         123.462 kg














I&O: 


 











 01/07/20 01/08/20 01/09/20





 06:59 06:59 06:59


 


Intake Total 720 1225 


 


Balance 720 1225 











Result Diagrams: 


 01/08/20 05:23





 01/08/20 04:41





Phys Exam





- Physical Examination


Constitutional: NAD


HEENT: moist MMs, sclera anicteric


Neck: supple, full ROM


mild wheezing, improved


Cardiovascular: RRR, no significant murmur


Gastrointestinal: soft, non-tender


Musculoskeletal: no edema, pulses present


Neurological: normal sensation, moves all 4 limbs


Psychiatric: normal affect, A&O x 3


Skin: no rash, normal turgor





Dx/Plan


(1) Chronic obstructive asthma with exacerbation


Code(s): J44.1 - CHRONIC OBSTRUCTIVE PULMONARY DISEASE W (ACUTE) EXACERBATION; 

J45.901 - UNSPECIFIED ASTHMA WITH (ACUTE) EXACERBATION   Status: Acute   





(2) Elevated brain natriuretic peptide (BNP) level


Code(s): R79.89 - OTHER SPECIFIED ABNORMAL FINDINGS OF BLOOD CHEMISTRY   Status

: Acute   





(3) Asthma


Code(s): J45.909 - UNSPECIFIED ASTHMA, UNCOMPLICATED   Status: Chronic   





(4) BMI 50.0-59.9, adult


Code(s): Z68.43 - BODY MASS INDEX (BMI) 50.0-59.9, ADULT   Status: Chronic   





(5) CHF (congestive heart failure)


Code(s): I50.9 - HEART FAILURE, UNSPECIFIED   Status: Chronic   





(6) ESRD (end stage renal disease) on dialysis


Code(s): N18.6 - END STAGE RENAL DISEASE; Z99.2 - DEPENDENCE ON RENAL DIALYSIS 

  Status: Chronic   





(7) Gastroesophageal reflux disease


Code(s): K21.9 - GASTRO-ESOPHAGEAL REFLUX DISEASE WITHOUT ESOPHAGITIS   Status: 

Chronic   


Qualifiers: 


   Esophagitis presence: esophagitis presence not specified   Qualified Code(s)

: K21.9 - Gastro-esophageal reflux disease without esophagitis   





(8) Obstructive sleep apnea


Code(s): G47.33 - OBSTRUCTIVE SLEEP APNEA (ADULT) (PEDIATRIC)   Status: Chronic

   





- Plan


Plan: 





Acute asthma exacerbation


A- Improved, CXR shows improvement as well. still on O2 but pt has O2 at home 

prn.


P- continue duonebs


-continue prednisone


-possible DC this afternoon if pt continues to improve





ESRD on HD (T/Th/S) 


A-on Midodrine for BP around dialysis, has hypotension with HD. 


P- continue dialysis





LLE pain and bruising


A- XR of ankle and tib fib ordered, no acute pathology. Venous dopplers ruled 

out DVT. 


P- f/u oupt, palliative measures





RACHID / obesity hypoventilation syndrome


-use home CPAP/settings





HFpEF


A- Echo 12/2018 EF 55-60%. Suggested diastolic dysfunction


P- will be mindful of fluids





Anemia of chronic disease


-continue home medications/supplements 





Volume overload


- on admission, pulled 4L off at dialysis Does not appear to be 

clinically fluid overloaded this AM





GERD


-Continue home medications





Intention tremor


-continue home medications





Deconditioning 


-Patient does not walk much at home, PT





H/o HTN


-not currently on medication, will monitor pressures.











Code: Cardiac only, DNI





Addendum - Attending





- Attending Attestation


Date/Time: 01/08/20 5911





I personally evaluated the patient and discussed the management with the team.


I agree with the History, Examination, Assessment and Plan documented above 

with any addition or exceptions noted below.

## 2020-01-09 NOTE — DIS
DATE OF ADMISSION:  01/08/2020



DATE OF DISCHARGE:  01/08/2020



ADMITTING ATTENDING:  Lynn Seaman MD



DISCHARGE ATTENDING:  Momo Thomas MD



RESIDENT:  Lucio Tran MD



CONSULTS:  None.



PROCEDURES:  

1. On 01/06/2020, venogram; impression, limited evaluation of distal superficial

femoral veins bilaterally on grayscale imaging, which limits the evaluation of

nonocclusive DVT at these times, but there is flow within these venous structures

without evidence of occlusive thrombus.  There is otherwise no evidence of a deep

vein thrombosis in the visualized deep venous structures in bilateral lower

extremities. 

2. On 01/07/2020, ankle x-ray; impression, no acute fracture.

3. On 01/07/2020, tibia-fibula x-ray; impression, no acute fracture or acute

processes. 



DISCHARGE MEDICATIONS:  

1. Nortriptyline 50 mg p.o. at bedtime.

2. Midodrine 5 mg p.o. q.2 days for dialysis.

3. Lyrica 100 mg p.o. b.i.d.

4. Montelukast 10 mg p.o. daily.

5. Vitamin B 1000 mcg p.o. daily.

6. Plavix 75 mg p.o. daily.

7. Prednisone taper over seven days, 40 mg to 10 mg, then resume home dosing of 2.5

mg p.o. daily. 

8. Atorvastatin 80 mg p.o. at bedtime.

9. Primidone 50 mg p.o. b.i.d.

10. Budesonide 0.5 mg inhaled daily.

11. Calcium supplement.

12. Folic acid.

13. Flonase 1 spray each nares at bedtime.

14. Ipratropium/albuterol 3 mL nebulized b.i.d. p.r.n.

15. Celecoxib.

16. Fosrenol 1000 mg p.o. t.i.d.

17. Symbicort two puffs inhaled b.i.d.

18. Nystatin one application topical b.i.d.

19. Claritin 10 mg p.o. daily.

20. Sensipar.

21. Sevelamer carbonate 2.4 g p.o. t.i.d.

22. Protonix 40 mg p.o. daily.

23. Tessalon 100 mg p.o. t.i.d. p.r.n.



DISCONTINUED MEDICATIONS:  None.



PRIMARY DIAGNOSIS:  Hypoxic respiratory failure secondary to asthma exacerbation.



SECONDARY DIAGNOSES:  End-stage renal disease, on hemodialysis; left lower extremity

pain and bruising; obstructive sleep apnea; obesity hypoventilation syndrome; heart

failure with preserved ejection fraction; anemia of chronic disease; volume

overload; gastroesophageal reflux disease; intention tremor; deconditioning; and

history of hypertension. 



HISTORY OF PRESENT ILLNESS/HOSPITAL COURSE:  This is a 64-year-old female with

history of asthma, who presented in respiratory distress.  She was admitted for

asthma exacerbation.  Her asthma is severe and she has maximal therapy including

baseline dose of prednisone 2.5 mg p.o. daily at home and follows with Dr. Figueroa as

pulmonologist.  However, during this hospitalization, she responded very quickly to

prednisone and magnesium and was able to be discharged after only a few days.  She

was discharged with her home oxygen and plans to follow up with Dr. Figueroa shortly. 



DISPOSITION:  Stable.



DISCHARGE INSTRUCTIONS:  Location:  Home. 



Activity:  As tolerated. 



Diet:  Diabetic diet, heart failure diet.



FOLLOWUP:  Follow up with Dr. Riggs in 1 to 2 weeks.  Follow up with Dr. Figueroa in 2

to 3 weeks. 







Job ID:  [id number]

## 2020-01-10 NOTE — PQF
[employer] Winform ViewerALYSSIA SERNA             
                               NIYAH GAUTHIER MD

[id number]                                                             [id number]

[id number]                             

                                   

CLINICAL DOCUMENTATION CLARIFICATION FORM:  POST DISCHARGE



Addendum to original discharge summary date:  __________________________________
____



Late entry note date:  __1/11/2020______________________________________________
___________











DATE:   01/10/2020                                 ATTN: NIYAH GAUTHIER MD



Please exercise your independent, professional judgment in responding to the 
clarification form. 

Clinical indicators are provided on the bottom of this form for your review



Please check appropriate box(s):

[  ] Acute Respiratory Failure with Hypoxia

[  ] Chronic Respiratory Failure with Hypoxia  

[ x ] Acute on chronic Respiratory Failure with Hypoxia  

[  ] Other diagnosis ___________

[  ] Unable to determine



In addition, please specify:

Present on Admission (POA):  [  ] Yes             [  ] No             [  ] 
Unable to determine



For continuity of documentation, please document condition throughout progress 
notes and discharge summary.  Thank You.





CLINICAL INDICATORS - SIGNS / SYMPTOMS / LABS

Respiratory Failure with Hypoxia - Documented in DS on 01/08 by Marc hayes

Patient was admitted with asthma exacerbation   -  Documented in DS on 01/08 by 
Marc hayes

ABG pCO2 49.2 on 01/06, ABG pH 7.41 - Documented in Laboratory Blood Gas

O2 saturation 98% on 01/07 , 92% on 01/08 and 90% on 01/08 - Documented in 
Vital signs

Respiration rate 22 on 01/07 and 24 on 01/07 - Documented in Vital signs

Possible pneumonitis - Documented in Family medicine PNs on 01/08 by Marc hayes



RISK FACTORS

Asthma exacerbation

ESRD

HTN

Obesity hypoventilation syndrome -Documented in DS on 01/08 by Marc hayes



TREATMENTS:

O2 Delivery  Nasal cannula on 01/07 and 01/08 -  Documented in Vital signs



[employer]





(This form is maintained as a part of the permanent medical record)

2015 [employer].  All Rights Reserved

[email or web address]    [not 
provided]

                                                              



 









MTDD

## 2020-02-03 NOTE — RAD
PA AND LATERAL VIEWS CHEST:

 

HISTORY: 

Dyspnea.

 

FINDINGS/IMPRESSION:  

Comparison is made with the exam of 1/8/2020.

 

The heart size is enlarged.  There is mild prominence of the pulmonary vascularity.  No lobar consoli
dation, pneumothoraces, or large effusions are seen.  

 

POS: OFF

## 2020-12-16 NOTE — CT
HEAD CT WITH AND WITHOUT CONTRAST:

 

HISTORY: 

Persistent headache.

 

FINDINGS: 

 

NONCONTRAST HEAD CT:

No parenchymal hemorrhage.

 

No extraaxial hematoma.

 

No midline shift.

 

Basilar cisterns are patent.

 

Brain volume, age appropriate.

 

Cortical gray-white matter differentiation is preserved.

 

No hydrocephalus.

 

Right maxillary sinus disease.  Intact calvarium.  

 

 

POSTCONTRAST HEAD CT:

No pathologic enhancement of the brain parenchyma.  Questionable peripherally enhancing lesion in the
 medial inferior right frontal lobe.  This lesion is along the course of the anterior cerebral arteri
es.  There is a 2nd focus of possible cortical-based enhancement involving the medial left frontal lo
be measuring 0.8 cm.  

 

IMPRESSION: 

Questionable areas of enhancement as described above.  Barring any contraindications, brain MRI with 
and without contrast is recommended.

 

POS: [facility]

## 2021-01-06 NOTE — MRI
MRI BRAIN NONCONTRAST:



DATE:

1/6/2021



HISTORY:

65-year-old female with ICD-10: "G 44.52, new daily persistent headaches"

Abnormal CT with and without contrast of 12/16/2020



COMPARISON:

MRI of 3/1/2018.



FINDINGS:

All of the images are significantly degraded by patient motion. According to the MRI technologist not
e, the patient has "involuntary tremors." There is no obstructive hydrocephalus. There is no

midline shift or any other evidence of mass effect. There is no extra-axial fluid collection. There a
re mild chronic ischemic white matter changes due to microvascular atherosclerosis. There is

otherwise no evidence of major intra-axial signal abnormality, recent hemorrhage, or restricted diffu
stella. Within the limitations of the severe degradation by motion, there is probably no interval

change since 3/1/2020 MRI.



IMPRESSION:

1) mild chronic ischemic white matter changes.

2) no other pathology identified.

3) Limited study: All images are severely degraded by patient motion. Therefore, the subtle, tiny foc
i of enhancement noted on the CT of 12/16/2020, cannot be evaluated by this MRI.



Reported By: Waldemar Gómez 

Electronically Signed:  1/6/2021 11:41 AM

## 2021-02-16 ENCOUNTER — HOSPITAL ENCOUNTER (INPATIENT)
Dept: HOSPITAL 92 - ERS | Age: 66
LOS: 10 days | Discharge: NURSING, REHAB OR LONG-TERM CARE | DRG: 917 | End: 2021-02-26
Attending: FAMILY MEDICINE | Admitting: FAMILY MEDICINE
Payer: MEDICARE

## 2021-02-16 VITALS — BODY MASS INDEX: 54.8 KG/M2

## 2021-02-16 DIAGNOSIS — Z79.52: ICD-10-CM

## 2021-02-16 DIAGNOSIS — J96.02: ICD-10-CM

## 2021-02-16 DIAGNOSIS — Z90.49: ICD-10-CM

## 2021-02-16 DIAGNOSIS — R77.8: ICD-10-CM

## 2021-02-16 DIAGNOSIS — G93.41: ICD-10-CM

## 2021-02-16 DIAGNOSIS — Z88.1: ICD-10-CM

## 2021-02-16 DIAGNOSIS — Z79.01: ICD-10-CM

## 2021-02-16 DIAGNOSIS — J45.909: ICD-10-CM

## 2021-02-16 DIAGNOSIS — Z99.3: ICD-10-CM

## 2021-02-16 DIAGNOSIS — N18.6: ICD-10-CM

## 2021-02-16 DIAGNOSIS — R25.1: ICD-10-CM

## 2021-02-16 DIAGNOSIS — E83.39: ICD-10-CM

## 2021-02-16 DIAGNOSIS — E66.2: ICD-10-CM

## 2021-02-16 DIAGNOSIS — Z99.2: ICD-10-CM

## 2021-02-16 DIAGNOSIS — F41.9: ICD-10-CM

## 2021-02-16 DIAGNOSIS — D63.1: ICD-10-CM

## 2021-02-16 DIAGNOSIS — E78.5: ICD-10-CM

## 2021-02-16 DIAGNOSIS — Z20.822: ICD-10-CM

## 2021-02-16 DIAGNOSIS — E55.9: ICD-10-CM

## 2021-02-16 DIAGNOSIS — Z79.899: ICD-10-CM

## 2021-02-16 DIAGNOSIS — G62.9: ICD-10-CM

## 2021-02-16 DIAGNOSIS — Z91.048: ICD-10-CM

## 2021-02-16 DIAGNOSIS — Z88.8: ICD-10-CM

## 2021-02-16 DIAGNOSIS — T42.6X1A: Primary | ICD-10-CM

## 2021-02-16 DIAGNOSIS — Z79.51: ICD-10-CM

## 2021-02-16 DIAGNOSIS — K21.9: ICD-10-CM

## 2021-02-16 DIAGNOSIS — I12.0: ICD-10-CM

## 2021-02-16 DIAGNOSIS — Z91.040: ICD-10-CM

## 2021-02-16 LAB
ALBUMIN SERPL BCG-MCNC: 3.7 G/DL (ref 3.4–4.8)
ALP SERPL-CCNC: 86 U/L (ref 40–110)
ALT SERPL W P-5'-P-CCNC: 12 U/L (ref 8–55)
ANALYZER IN CARDIO: (no result)
ANALYZER IN CARDIO: (no result)
ANION GAP SERPL CALC-SCNC: 23 MMOL/L (ref 10–20)
APAP SERPL-MCNC: (no result) MCG/ML (ref 10–30)
AST SERPL-CCNC: 12 U/L (ref 5–34)
BASE EXCESS STD BLDA CALC-SCNC: -0.6 MEQ/L
BASE EXCESS STD BLDA CALC-SCNC: -0.6 MEQ/L
BASOPHILS # BLD AUTO: 0 THOU/UL (ref 0–0.2)
BASOPHILS NFR BLD AUTO: 0.1 % (ref 0–1)
BILIRUB SERPL-MCNC: 0.6 MG/DL (ref 0.2–1.2)
BUN SERPL-MCNC: 38 MG/DL (ref 9.8–20.1)
CA-I BLDA-SCNC: 1.03 MMOL/L (ref 1.12–1.3)
CA-I BLDA-SCNC: 1.05 MMOL/L (ref 1.12–1.3)
CALCIUM SERPL-MCNC: 8.1 MG/DL (ref 7.8–10.44)
CHLORIDE SERPL-SCNC: 96 MMOL/L (ref 98–107)
CK MB SERPL-MCNC: 4.3 NG/ML (ref 0–6.6)
CO2 SERPL-SCNC: 28 MMOL/L (ref 23–31)
CREAT CL PREDICTED SERPL C-G-VRATE: 0 ML/MIN (ref 70–130)
EOSINOPHIL # BLD AUTO: 0.1 THOU/UL (ref 0–0.7)
EOSINOPHIL NFR BLD AUTO: 1 % (ref 0–10)
GLOBULIN SER CALC-MCNC: 2.9 G/DL (ref 2.4–3.5)
GLUCOSE SERPL-MCNC: 83 MG/DL (ref 80–115)
HCO3 BLDA-SCNC: 28.4 MEQ/L (ref 22–28)
HCO3 BLDA-SCNC: 28.5 MEQ/L (ref 22–28)
HCT VFR BLDA CALC: 44 % (ref 36–47)
HCT VFR BLDA CALC: 45 % (ref 36–47)
HGB BLD-MCNC: 16.2 G/DL (ref 12–16)
HGB BLDA-MCNC: 15.1 G/DL (ref 12–16)
HGB BLDA-MCNC: 15.3 G/DL (ref 12–16)
LIPASE SERPL-CCNC: 31 U/L (ref 8–78)
LYMPHOCYTES # BLD: 1.7 THOU/UL (ref 1.2–3.4)
LYMPHOCYTES NFR BLD AUTO: 12.8 % (ref 21–51)
MACROCYTES BLD QL SMEAR: (no result) (100X)
MAGNESIUM SERPL-MCNC: 2 MG/DL (ref 1.6–2.6)
MCH RBC QN AUTO: 33 PG (ref 27–31)
MCV RBC AUTO: 106 FL (ref 78–98)
MDIFF COMPLETE?: YES
MONOCYTES # BLD AUTO: 1.3 THOU/UL (ref 0.11–0.59)
MONOCYTES NFR BLD AUTO: 10.1 % (ref 0–10)
NEUTROPHILS # BLD AUTO: 9.9 THOU/UL (ref 1.4–6.5)
NEUTROPHILS NFR BLD AUTO: 76.1 % (ref 42–75)
PCO2 BLDA: 66.2 MMHG (ref 35–45)
PCO2 BLDA: 67.2 MMHG (ref 35–45)
PH BLDA: 7.25 [PH] (ref 7.35–7.45)
PH BLDA: 7.25 [PH] (ref 7.35–7.45)
PLATELET # BLD AUTO: 307 THOU/UL (ref 130–400)
PO2 BLDA: 104.8 MMHG (ref 80–?)
PO2 BLDA: 86.5 MMHG (ref 80–?)
POLYCHROMASIA BLD QL SMEAR: (no result) (100X)
POTASSIUM BLD-SCNC: 4.6 MMOL/L (ref 3.7–5.3)
POTASSIUM BLD-SCNC: 4.6 MMOL/L (ref 3.7–5.3)
POTASSIUM SERPL-SCNC: 4.9 MMOL/L (ref 3.5–5.1)
RBC # BLD AUTO: 4.91 MILL/UL (ref 4.2–5.4)
SALICYLATES SERPL-MCNC: (no result) MG/DL (ref 15–30)
SODIUM SERPL-SCNC: 142 MMOL/L (ref 136–145)
SPECIMEN DRAWN FROM PATIENT: (no result)
SPECIMEN DRAWN FROM PATIENT: (no result)
TARGETS BLD QL SMEAR: (no result) (100X)
TROPONIN I SERPL DL<=0.01 NG/ML-MCNC: 0.05 NG/ML (ref ?–0.03)
WBC # BLD AUTO: 13 THOU/UL (ref 4.8–10.8)

## 2021-02-16 PROCEDURE — 83690 ASSAY OF LIPASE: CPT

## 2021-02-16 PROCEDURE — 86900 BLOOD TYPING SEROLOGIC ABO: CPT

## 2021-02-16 PROCEDURE — 90935 HEMODIALYSIS ONE EVALUATION: CPT

## 2021-02-16 PROCEDURE — 71045 X-RAY EXAM CHEST 1 VIEW: CPT

## 2021-02-16 PROCEDURE — 0240U: CPT

## 2021-02-16 PROCEDURE — 86901 BLOOD TYPING SEROLOGIC RH(D): CPT

## 2021-02-16 PROCEDURE — 80202 ASSAY OF VANCOMYCIN: CPT

## 2021-02-16 PROCEDURE — 82140 ASSAY OF AMMONIA: CPT

## 2021-02-16 PROCEDURE — 80307 DRUG TEST PRSMV CHEM ANLYZR: CPT

## 2021-02-16 PROCEDURE — 36416 COLLJ CAPILLARY BLOOD SPEC: CPT

## 2021-02-16 PROCEDURE — 70450 CT HEAD/BRAIN W/O DYE: CPT

## 2021-02-16 PROCEDURE — 86850 RBC ANTIBODY SCREEN: CPT

## 2021-02-16 PROCEDURE — G0257 UNSCHED DIALYSIS ESRD PT HOS: HCPCS

## 2021-02-16 PROCEDURE — 84484 ASSAY OF TROPONIN QUANT: CPT

## 2021-02-16 PROCEDURE — 85025 COMPLETE CBC W/AUTO DIFF WBC: CPT

## 2021-02-16 PROCEDURE — 36600 WITHDRAWAL OF ARTERIAL BLOOD: CPT

## 2021-02-16 PROCEDURE — 82553 CREATINE MB FRACTION: CPT

## 2021-02-16 PROCEDURE — 96367 TX/PROPH/DG ADDL SEQ IV INF: CPT

## 2021-02-16 PROCEDURE — 84145 PROCALCITONIN (PCT): CPT

## 2021-02-16 PROCEDURE — 94660 CPAP INITIATION&MGMT: CPT

## 2021-02-16 PROCEDURE — 36415 COLL VENOUS BLD VENIPUNCTURE: CPT

## 2021-02-16 PROCEDURE — 87040 BLOOD CULTURE FOR BACTERIA: CPT

## 2021-02-16 PROCEDURE — 83880 ASSAY OF NATRIURETIC PEPTIDE: CPT

## 2021-02-16 PROCEDURE — 87340 HEPATITIS B SURFACE AG IA: CPT

## 2021-02-16 PROCEDURE — 83735 ASSAY OF MAGNESIUM: CPT

## 2021-02-16 PROCEDURE — G0378 HOSPITAL OBSERVATION PER HR: HCPCS

## 2021-02-16 PROCEDURE — 96365 THER/PROPH/DIAG IV INF INIT: CPT

## 2021-02-16 PROCEDURE — 94640 AIRWAY INHALATION TREATMENT: CPT

## 2021-02-16 PROCEDURE — 80048 BASIC METABOLIC PNL TOTAL CA: CPT

## 2021-02-16 PROCEDURE — 82805 BLOOD GASES W/O2 SATURATION: CPT

## 2021-02-16 PROCEDURE — 83605 ASSAY OF LACTIC ACID: CPT

## 2021-02-16 PROCEDURE — 93005 ELECTROCARDIOGRAM TRACING: CPT

## 2021-02-16 PROCEDURE — 80053 COMPREHEN METABOLIC PANEL: CPT

## 2021-02-16 RX ADMIN — HEPARIN SODIUM SCH UNITS: 5000 INJECTION, SOLUTION INTRAVENOUS; SUBCUTANEOUS at 22:13

## 2021-02-16 NOTE — RAD
PORTABLE CHEST 1 VIEW:

 

Date:  02/16/2021

Time:  1623 hours

 

HISTORY:  

Altered mental status. Patient on dialysis. 

 

COMPARISON:  

02/03/2020. 

 

FINDINGS/IMPRESSION: 

The heart is enlarged. There is mild prominence of the pulmonary vascularity. No lobar consolidation,
 pneumothoraces, or large effusions are seen. There is haziness in the lower lung fields which may be
 due to developing pneumonia. 

 

 

POS: [facility]

## 2021-02-16 NOTE — PDOC.FPRHP
- History of Present Illness


Chief Complaint: AMS


History of Present Illness: 





Patient is a 65F with PMHx of ESRD on HD (Tues/Th/Sat with Dr. Cleveland), HTN, GERD, 

Asthma, vitamin D deficiency, neuropathy, and sleep apnea (uses CPAP at night) 

that was sent to the ED from dialysis today for AMS. Patient lives with her 

nephew and he states that for the past week she has had memory issues by which 

she will repeat phrases and have difficulty saying exactly what she wants to 

say, as well as worsening tremors. She endorses a slight intermittent cough and 

some congestion during the same time. Denies fevers. Endorses some dyspnea. 

Denies chest pain. Nephew states that she is supposed to use 2L NC at home but 

has not been wearing it, for which the patient states she has been "lazy." She 

uses a CPAP at night for sleep apnea and has been reportedly using this. 


Of note, patient has been evaluated in clinic recently for headaches and 

worsening tremors for which a brain CT w/ and without contrast demonstrated 

focal areas of enhancement. Brain MRI w/ and w/o contrast could not clearly 

define those areas due to patient movement, however upon speaking with radiology

it was suggested that the areas could be seen despite patient movement and they 

did not recommend f/u imaging. Patient saw Josefa Durant from Dr. Smith's off

ice and was started on topamax for her headaches, which she reports have greatly

improved. 


ED Course: 





2g rocephin (1843), 1g vancomycin (1930), 500ml NS





- Allergies/Adverse Reactions


                                    Allergies











Allergy/AdvReac Type Severity Reaction Status Date / Time


 


adhesive Allergy  Rash Verified 02/17/21 05:53


 


Latex, Natural Rubber Allergy  Rash Verified 02/17/21 05:53


 


levofloxacin [From Levaquin] Allergy  Rash Verified 02/17/21 05:53


 


silver Allergy   Verified 02/17/21 05:53





[From Tegaderm AG Mesh]     


 


soap [From Betadine] Allergy  Rash Verified 02/17/21 05:53














- Home Medications


                                        











 Medication  Instructions  Recorded  Confirmed  Type


 


Atorvastatin Calcium [Lipitor] 80 mg PO HS 04/05/18 02/17/21 History


 


Clopidogrel Bisulfate [Plavix] 75 mg PO QPM 04/05/18 02/17/21 History


 


Cyanocobalamin (Vitamin B-12) 1,000 mcg PO DAILY 04/05/18 02/17/21 History





[Vitamin B-12]    


 


Midodrine HCl [ProAmatine] 5 mg PO Q2DAYS 04/05/18 02/17/21 History


 


Montelukast Sodium [Singulair] 10 mg PO HS 04/05/18 02/17/21 History


 


Pregabalin [Lyrica] 100 mg PO BID 04/05/18 02/17/21 History


 


Primidone [Mysoline] 100 mg PO BID 04/05/18 02/17/21 History


 


predniSONE [Prednisone] 2.5 mg PO QAM 04/05/18 02/17/21 History


 


Budesonide 0.5 mg IH BID 08/30/18 02/17/21 History


 


Calcium Acetate 667 mg PO TID-WM 08/30/18 02/17/21 History


 


Acetaminophen [Tylenol Regular 650 mg PO Q4H PRN  tab 12/07/18 02/17/21 Rx





Strength]    


 


Budesonide-Formoterol [Symbicort 2 puff INH BID 12/07/18 02/17/21 History





160-4.5]    


 


Celecoxib 200 mg PO BID-AC 12/07/18 02/17/21 History


 


Fluticasone Propionate [Flonase 1 spray EA NARE HS 12/07/18 02/17/21 History





Nasal Spray]    


 


Folic Acid 1 mg PO DAILY 12/07/18 02/17/21 History


 


Ipratropium/Albuterol Sulfate 3 ml NEB BID 12/07/18 02/17/21 History


 


Lanthanum Carbonate [Fosrenol] 1,000 mg PO TID-WM 12/07/18 02/17/21 History


 


Loratadine [Claritin] 10 mg PO DAILY 01/07/20 02/17/21 History


 


Pantoprazole [Protonix] 40 mg PO BID 01/07/20 02/17/21 History


 


Sevelamer Carbonate 2.4 gm PO TID-WM 01/07/20 02/17/21 History


 


predniSONE [Prednisone] 20 mg PO DAILY PRN 01/07/20 02/17/21 History


 


Cholecalciferol (Vitamin D3) 50 mcg PO DAILY 02/17/21 02/17/21 History





[Vitamin D3]    


 


Topiramate 50 mg PO QAM 02/17/21 02/17/21 History


 


predniSONE [Prednisone] 10 mg PO DAILY PRN 02/17/21 02/17/21 History














- History


PMHx: ESRD on HD (Tues/Th/Sat with Dr. Cleveland), HTN, GERD, Asthma, vitamin D 

deficiency, neuropathy, and sleep apnea (uses CPAP at night) 


 


PSHx: appendectomy, cholecystectomy, hernia repair, splenectomy, l arm fistula, 

L arm carpal tunnel, R wrist tendon release, parathyroidectomy





FHx: non-contributory


 


Social: no alcohol/drugs/smoking


 








- Review of Systems


General: denies: fever/chills, weight/appetite/sleep changes


Eyes: denies: eye pain, vision changes


ENT: denies: nasal congestion, rhinorrhea


Respiratory: reports: cough, shortness of breath


Cardiovascular: denies: chest pain, edema


Gastrointestinal: denies: nausea, vomiting, diarrhea, constipation, abdominal 

pain


Genitourinary: reports: other (patient is anuric).  denies: incontinence, 

dysuria, polyuria


Skin: denies: rashes, lesions


Musculoskeletal: denies: pain, tenderness, stiffness


Neurological: reports: weakness, other (increasing tremors BLE and BUE).  

denies: numbness, seizure


Psychological: denies: anxiety, depression





- Vital signs


BP: [109/60]  HR: [91] RR: [20] Tmax: [99.5F] Pox: [98]% on [Bipap]  Wt: 

[106.14kg]   








- Physical Exam


Constitutional: well developed


HEENT: normocephalic and atraumatic, EOMI, grossly normal hearing, normal nasal 

mucosa, MMM


Neck: supple, FROM, trachea midline


Chest: no-tender to palpation


Heart: RRR, normal S1/S2


Lungs: other (poor respiratory effort, decreased air movement)


Abdomen: soft, non-tender, no masses/distention


Musculoskeletal: normal structure, normal tone, other (decreased abduction of 

bilateral arms)


Neurological: other (BLE and BUE resting tremor; inable to do finger/nose 

testing due to tremor but can do heel/shin testing; strength intact BLE and BUE;

 A&Ox3/4 (day/time), repetitive speech)


Skin: good turgor, capillary refill <2 seconds, no jaundice


Heme/Lymphatic: other (mild bruising BLE)


Psychiatric: normal mood and affect, intact recent and remote memory





FMR H&P: Results





- Labs


Result Diagrams: 


                                 02/17/21 04:08





                                 02/17/21 04:08


Lab results: 


                                        











WBC  13.0 thou/uL (4.8-10.8)  H  02/16/21  16:43    


 


Hgb  16.2 g/dL (12.0-16.0)  H  02/16/21  16:43    


 


Hct  52.0 % (36.0-47.0)  H  02/16/21  16:43    


 


MCV  106.0 fL (78.0-98.0)  H  02/16/21  16:43    


 


Plt Count  307 thou/uL (130-400)   02/16/21  16:43    


 


Neutrophils %  76.1 % (42.0-75.0)  H  02/16/21  16:43    


 


ABG pH  7.25  (7.35-7.45)  L*  02/16/21  19:10    


 


ABG pCO2  66.2 mmHg (35.0-45.0)  H*  02/16/21  19:10    


 


ABG pO2  104.8 mmHg (> 80.0)  H  02/16/21  19:10    


 


Sodium  142 mmol/L (136-145)   02/16/21  16:43    


 


Potassium  4.9 mmol/L (3.5-5.1)   02/16/21  16:43    


 


Chloride  96 mmol/L ()  L  02/16/21  16:43    


 


Carbon Dioxide  28 mmol/L (23-31)   02/16/21  16:43    


 


BUN  38 mg/dL (9.8-20.1)  H  02/16/21  16:43    


 


Creatinine  7.84 mg/dL (0.6-1.1)  H  02/16/21  16:43    


 


Glucose  83 mg/dL ()   02/16/21  16:43    


 


Lactic Acid  2.0 mmol/L (0.5-2.2)   02/16/21  16:44    


 


Calcium  8.1 mg/dL (7.8-10.44)   02/16/21  16:43    


 


Total Bilirubin  0.6 mg/dL (0.2-1.2)   02/16/21  16:43    


 


AST  12 U/L (5-34)   02/16/21  16:43    


 


ALT  12 U/L (8-55)   02/16/21  16:43    


 


Alkaline Phosphatase  86 U/L ()   02/16/21  16:43    


 


CK-MB (CK-2)  4.3 ng/mL (0-6.6)   02/16/21  16:44    


 


B-Natriuretic Peptide  173.1 pg/mL (0-100)  H  02/16/21  16:43    


 


Serum Total Protein  6.6 g/dL (5.8-8.1)   02/16/21  16:43    


 


Albumin  3.7 g/dL (3.4-4.8)   02/16/21  16:43    


 


Lipase  31 U/L (8-78)   02/16/21  16:43    














- EKG Interpretation


EKG: 





NSR, vr 97, , QRS 88, QTc 462ms





- Radiology Interpretation


  ** Chest x-ray


Status: report reviewed by me (cardiomegaly, prominent pulmonary vasculature; 

lower lung haziness suggestive of possible early pna)





  ** CT scan - head


Status: report reviewed by me (no acute process; right maxillary sinusitis)





FMR H&P: A/P





- Plan





Patient is a 65F with PMHx of ESRD on HD (Tues/Th/Sat with Dr. Cleveland), HTN, GERD, 

Asthma, vitamin D deficiency, neuropathy, and sleep apnea (uses CPAP at night) 

admitted for:





#Metabolic encephalopathy likely 2/2 acute hypercapneic respiratory failure, 

possibly 2/2 community acquired pna


-patient transferred to ED from HD for AMS, patient is A&O x3/4


-has had worsening repetitive speech and worsening tremors over the last week


-BUN 38, has been similar in the past and patient has not missed any of her hung 

HD appts


-ABG in ED: pH 7.25, pCO2 66.2, pO2 104.8, 98.4%; patient placed on bipap for 

hypercapnia


-repeat ABG in ED pH 7.25, pCO2 67.2, pO2 86.5, 96.2%, patient re-evaluated and 

answered questions appropriately without somnolence; will continue bipap 

overnight 


-patient has not been wearing her home O2 for the past month but has been using 

her night-time cpap


-COVID/flu negative


-will admit to IMCU for further respiratory monitoring and support


-CXR demonstrates cardiomegaly, prominence of the pulmonary vasculature, and 

lower lung haziness suggestive of possible early pna


-received 2g rocephin and 1g vanc in the ED


-procal 0.46, will continue to monitor respiratory status and pending clinical 

picture in am can consider continuing abx


-will d/c topamax for now as this can be associated with increased drowsiness 

and tremors


-will hold pregabalin as association with drowsiness





#Worsening resting tremors


-worsening over the last week


-patient of Dr. Resendez


-CT/MRI of brain within the past few months, see HPI


-on primidone at home


-can consider consulting neurology in am 





#Elevated troponin


-trop 0.05, EKG demonstrates no ST changes, patient denies chest pain


-will continue to trend





#ESRD on HD


-patient has HD tues/thurs/sat, had 3hrs of HD today before being transferred to

 the ED


-Nephrologist is Dr. Cleveland


-Will consult Dr. Cleveland in am for HD while hospitalized


-can discuss medication dosing with Dr. Cleveland, discrepancy with fosrenaol between 

patient's paperwork and what was last documented


-continue other HD meds





#HTN?


-patient has hx of being borderline hypotensive in clinic


-takes midodrine on HD days, will continue while in hospital


-no HTN meds at this time





#Neuropathy


-hold pregabalin for now, can consider re-added in am if patient more alert





#Asthma


-continue home medication regimen





#Sleep apnea


-encourage use of cpap while in the hospital





Diet: Renal High protein


DVT ppx: heparin


Dispo: admitted to [facility] for continued respiratory monitoring and support with 

bipap


CODE: Full


PCP: Jorge Luis BARRY H&P: Upper Level





- Plan


Date/Time: 02/16/21 2036








I, [], have evaluated this patient and agree with findings/plan as outlined by 

intern resident. Pertinent changes/additions are listed here.








Addendum - Attending





- Attending Attestation


Date/Time: 02/17/21 6926





I personally evaluated the patient and discussed the management with Dr. Murray.


I agree with the History, Examination, Assessment and Plan documented above with

 any addition or exceptions noted below.





Besides RACHID/obesity hypoventilation syndrome it is not clear to me why she has 

had declining mental status. Admit to IMCU on BiPAP and monitor closely.

## 2021-02-16 NOTE — CT
CT head noncontrast



HISTORY: Altered mental status.



COMPARISON: 12/16/2020.



FINDINGS: There is no evidence of acute intracranial hemorrhage or infarct.



The ventricles appear normal in size, shape and position. Dystrophic calcification at the basal gangl
ia.



There is no mass effect or shift of midline structures. Complete mucosal opacification of the right m
axillary sinus.



  



IMPRESSION :

No acute intracranial abnormalities are demonstrated.



Right maxillary sinusitis.



Reported By: VIJAY Carolina 

Electronically Signed:  2/16/2021 5:25 PM

## 2021-02-17 LAB
ANION GAP SERPL CALC-SCNC: 25 MMOL/L (ref 10–20)
BUN SERPL-MCNC: 42 MG/DL (ref 9.8–20.1)
CALCIUM SERPL-MCNC: 8 MG/DL (ref 7.8–10.44)
CHLORIDE SERPL-SCNC: 100 MMOL/L (ref 98–107)
CO2 SERPL-SCNC: 21 MMOL/L (ref 23–31)
CREAT CL PREDICTED SERPL C-G-VRATE: 11 ML/MIN (ref 70–130)
GLUCOSE SERPL-MCNC: 88 MG/DL (ref 80–115)
HGB BLD-MCNC: 15.4 G/DL (ref 12–16)
MCH RBC QN AUTO: 33.2 PG (ref 27–31)
MCV RBC AUTO: 108 FL (ref 78–98)
MDIFF COMPLETE?: YES
PLATELET # BLD AUTO: 258 THOU/UL (ref 130–400)
POTASSIUM SERPL-SCNC: 5.1 MMOL/L (ref 3.5–5.1)
RBC # BLD AUTO: 4.63 MILL/UL (ref 4.2–5.4)
SODIUM SERPL-SCNC: 141 MMOL/L (ref 136–145)
TROPONIN I SERPL DL<=0.01 NG/ML-MCNC: 0.05 NG/ML (ref ?–0.03)
TROPONIN I SERPL DL<=0.01 NG/ML-MCNC: 0.06 NG/ML (ref ?–0.03)
WBC # BLD AUTO: 11.7 THOU/UL (ref 4.8–10.8)

## 2021-02-17 PROCEDURE — 5A09457 ASSISTANCE WITH RESPIRATORY VENTILATION, 24-96 CONSECUTIVE HOURS, CONTINUOUS POSITIVE AIRWAY PRESSURE: ICD-10-PCS | Performed by: FAMILY MEDICINE

## 2021-02-17 RX ADMIN — PANTOPRAZOLE SODIUM SCH MG: 40 GRANULE, DELAYED RELEASE ORAL at 21:07

## 2021-02-17 RX ADMIN — CYANOCOBALAMIN TAB 1000 MCG SCH MCG: 1000 TAB at 09:14

## 2021-02-17 RX ADMIN — HEPARIN SODIUM SCH UNITS: 5000 INJECTION, SOLUTION INTRAVENOUS; SUBCUTANEOUS at 09:15

## 2021-02-17 RX ADMIN — MOMETASONE FUROATE AND FORMOTEROL FUMARATE DIHYDRATE SCH PUFF: 200; 5 AEROSOL RESPIRATORY (INHALATION) at 18:10

## 2021-02-17 RX ADMIN — CEFTRIAXONE SCH MLS: 1 INJECTION, POWDER, FOR SOLUTION INTRAMUSCULAR; INTRAVENOUS at 09:13

## 2021-02-17 RX ADMIN — HEPARIN SODIUM SCH UNITS: 5000 INJECTION, SOLUTION INTRAVENOUS; SUBCUTANEOUS at 15:27

## 2021-02-17 RX ADMIN — LANTHANUM CARBONATE SCH MG: 500 TABLET, CHEWABLE ORAL at 11:52

## 2021-02-17 RX ADMIN — LANTHANUM CARBONATE SCH MG: 500 TABLET, CHEWABLE ORAL at 17:40

## 2021-02-17 RX ADMIN — HEPARIN SODIUM SCH UNITS: 5000 INJECTION, SOLUTION INTRAVENOUS; SUBCUTANEOUS at 21:08

## 2021-02-17 RX ADMIN — LANTHANUM CARBONATE SCH MG: 500 TABLET, CHEWABLE ORAL at 09:14

## 2021-02-17 RX ADMIN — MOMETASONE FUROATE AND FORMOTEROL FUMARATE DIHYDRATE SCH PUFF: 200; 5 AEROSOL RESPIRATORY (INHALATION) at 06:15

## 2021-02-17 NOTE — CON
DATE OF CONSULTATION:  2021



HISTORY OF PRESENT ILLNESS:  Ms. Bustillos is a 65-year-old  female with

ESRD - currently on maintenance hemodialysis and was admitted due to mental 
status

change.  At the end of the treatment with dialysis, the patient was noted to be 
a

bit more confused with decreased mentation.  There was no associated chest pain,

syncopal episode, fever, chills, nausea, or vomiting with this. 



The patient also has history of headache and worsening tremors. 



We are now being consulted for maintenance hemodialysis.  This morning, the 
patient

is mentating better than yesterday. 



REVIEW OF SYSTEMS:  No syncopal episode.  Positive for mental status change.  No

nausea.  No vomiting.  Appetite and energy level are decreased.  No abdominal 
pain.

No fever or chills.  No shortness of breath.  No chest pain.  No hematochezia.  
No

melena.  No hematemesis. 



HOME MEDICATIONS:  Include the followin. Atorvastatin 80 mg tablet at bedtime.

2. Clopidogrel 75 mg q.p.m.

3. Vitamin B12 of 1000 mcg daily.

4. Midodrine 5 mg every 2 days - before dialysis days.

5. Singulair 10 mg at bedtime.

6. Lyrica 100 mg p.o. b.i.d.

7. Primidone 100 mg p.o. b.i.d.

8. Prednisone 2.5 mg p.o. q.a.m.

9. Calcium acetate 667 mg p.o. t.i.d.

10. Fosrenol 1000 mg p.o. t.i.d. with meals.

11. Symbicort 2 puffs b.i.d.

12. Celecoxib 200 mg p.o. b.i.d.

13. Folic acid 1 mg daily.

14. Loratadine 10 mg daily.

15. Protonix 40 mg tab p.o.  with meals.

16. Prednisone 20 mg once a day.

17. Topiramate 50 mg at bedtime.

18. Prednisone 10 mg daily.



PAST MEDICAL HISTORY:  

1. ESRD, on maintenance hemodialysis of Tuesday, Thursday, Saturday.

2. History of morbid obesity.

3. Chronic asthma.

4. Hypertension.

5. GERD.

6. Hyperlipidemia.

7. Hyperphosphatemia.

8. Obstructive sleep apnea.



PAST SURGICAL HISTORY:  

1. Status post upper and lower GI endoscopy.

2. Status post cuffed hemodialysis catheter placement.

3. Status post carpal tunnel surgery.

4. Status post AV fistula placement.

5. Status post parathyroidectomy.

6. Status post cholecystectomy.

7. Status post hernia repair.

8. Status post splenectomy.



SOCIAL HISTORY:  The patient is single, lives with her niece.  She is a retired

[employer] employee.  Education, high school.  Lives in [place].  No alcohol

intake.  No smoking.  No children.  No drug abuse.  Sedentary lifestyle.  Status

post multiple blood transfusions. 



ALLERGIES:  NONE.



TRAUMA:  None.



IMMUNIZATIONS:  Up-to-date.



HOSPITALIZATIONS:  Please see past medical history.



FAMILY HISTORY:  Positive family history of ESRD.  Sister on dialysis - 
.



PHYSICAL EXAMINATION:

VITAL SIGNS:  Blood pressure is noted at 130/70, heart rate 70, O2 saturation 
98%. 

GENERAL:  Patient is awake, on BiPAP, not in distress, obese. 

SKIN:  Adequate turgor. 

HEENT:  She has pinkish conjunctivae.  Anicteric sclerae.  No neck mass.  No 
carotid

bruits.  No JVD. 

CHEST:  No deformities. 

LUNGS:  Clear breath sounds.  No wheezing.  No crackles. 

HEART:  Normal sinus rhythm.  No murmurs.  No gallops.  No rubs. 

ABDOMEN:  Globular, soft, nontender.  No masses. 

EXTREMITIES:  No edema.  No deformities. 

NEUROLOGIC:  The patient is awake.  Somewhat lethargic.  Can follow commands.

Oriented.  Moving all extremities. 



LABORATORY DATA:  2021; white count 11.7, hemoglobin 15.4.  Sodium 
141,

potassium 5.1, chloride 100, carbon dioxide 21, BUN 42, creatinine 8.69, glucose
88,

calcium 8. 



2021; chest x-ray shows inc lung markings in lower lung fields. 



CT scan of the brain, 2021, shows no acute intracranial 
abnormalities.



ASSESSMENT AND PLAN:  

1. Mental status change - consider the possibility this could be related to her

Lyrica.  I have tried to adjust it downwards in the past, but the patient 
declined

to adjust it for renal dosing.  I would hold off the Lyrica temporarily or 
adjust it

downwards.  Please note, CT scan of the brain was within normal. 

2. End-stage renal disease, stable.  We will continue current Tuesday, Thursday,
and

Saturday hemodialysis regimen.  Review of my last Kt/V suggests she is 
adequately

dialyzed with the current dialysis regimen. 

3. Hyperphosphatemia.  Continue phosphate binders. 



Thank you for the consult.  We will continue to follow.





Job ID:  [id number]



RUFINA

## 2021-02-17 NOTE — PDOC.FM
- Subjective


Subjective: 





Patient seen at bedside this morning, is arousable to voice when stating her 

name but her eyes do not remain open. She does follow commands (squeezes hands, 

pushes down feet) but does not verbalize any answers. Patient remains on BIPAP 

for respiratory support. ROS unobtainable. 





- Objective


Vital Signs & Weight: 


                             Vital Signs (12 hours)











  Temp Pulse Ox


 


 02/17/21 06:00  98.0 F 


 


 02/17/21 05:50   98








                                     Weight











Admit Weight                   122.924 kg


 


Weight                         123.3 kg











                            Most Recent Monitor Data











Heart Rate from ECG            78


 


NIBP                           121/63


 


NIBP BP-Mean                   82


 


Respiration from ECG           15


 


SpO2                           98














I&O: 


                                        











 02/16/21 02/17/21 02/18/21





 06:59 06:59 06:59


 


Intake Total  0 


 


Output Total  0 


 


Balance  0 











Result Diagrams: 


                                 02/17/21 04:08





                                 02/17/21 04:08





Phys Exam





- Physical Examination


Constitutional: NAD


arousable to voice, follows simple commands, does not verbalize


morbidly obese


HEENT: moist MMs


Neck: supple


Respiratory: no wheezing, no rales, no rhonchi


scattered crackles, diminished sounds at bases


Cardiovascular: RRR, no significant murmur


Gastrointestinal: soft, no distention


Musculoskeletal: pulses present


Deviation from normal: arouses to name, unable to assess orientation


Skin: no rash, normal turgor





Dx/Plan


(1) Metabolic encephalopathy


Code(s): G93.41 - METABOLIC ENCEPHALOPATHY   Status: Acute   





(2) Acute hypercapnic respiratory failure


Code(s): J96.02 - ACUTE RESPIRATORY FAILURE WITH HYPERCAPNIA   Status: Acute   





(3) ESRD (end stage renal disease) on dialysis


Code(s): N18.6 - END STAGE RENAL DISEASE; Z99.2 - DEPENDENCE ON RENAL DIALYSIS  

Status: Chronic   





(4) RACHID on CPAP


Code(s): G47.33 - OBSTRUCTIVE SLEEP APNEA (ADULT) (PEDIATRIC); Z99.89 - 

DEPENDENCE ON OTHER ENABLING MACHINES AND DEVICES   Status: Chronic   





(5) Obesity hypoventilation syndrome


Code(s): E66.2 - MORBID (SEVERE) OBESITY WITH ALVEOLAR HYPOVENTILATION   Status:

Chronic   





- Plan


Plan: 





Patient is a 65F with PMHx of ESRD on HD (Tues/Th/Sat with Dr. Cleveland), HTN, GERD, 

Asthma, vitamin D deficiency, neuropathy, and sleep apnea (uses CPAP at night) 

admitted for:





#Metabolic encephalopathy likely 2/2 acute hypercapneic respiratory failure, 

possibly 2/2 community acquired pna


#Obesity Hypoventilation Syndrome


#RACHID


-patient transferred to ED from HD for AMS, patient is A&O x3/4 at baseline


-has had worsening repetitive speech and worsening tremors over the last week


   -currently no tremor this morning


-BUN 38, has been similar in the past and patient has not missed any of her hung 

HD appts


-ABG in ED: pH 7.25, pCO2 66.2, pO2 104.8, 98.4%; patient placed on bipap for 

hypercapnia


-repeat ABG 2 hours later in ED pH 7.25, pCO2 67.2, pO2 86.5, 96.2%, patient re-

evaluated and answered questions appropriately without somnolence


-continue BIPAP for respiratory support 


-patient has not been wearing her home O2 for the past month but has been using 

her night-time CPAP


-COVID/flu negative


-CXR demonstrates cardiomegaly, prominence of the pulmonary vasculature, and 

lower lung haziness suggestive of possible early pna


-received 2g rocephin and 1g vanc in the ED--will continue Rocephin for coverage


-procal 0.46


-will d/c topamax for now as this can be associated with increased drowsiness 

and tremors


-will hold pregabalin as association with drowsiness


-consult Pulmonology, Dr. Landa--appreciate recs





#Worsening resting tremors


-worsening over the last week, none apparent on exam this morning


-patient of Dr. Resendez, has next appointment in March


-CT/MRI of brain within the past few months, see HPI


-on primidone at home


-can consider consulting neurology if tremors worsening





#Elevated troponin


-trop 0.051 > 0.058 > 0.049


-EKG demonstrates no ST changes, patient denies chest pain





#ESRD on HD


-patient has HD tues/thurs/sat, had 3hrs of HD today before being transferred to

the ED


-Consult Nephrologist, Dr. Cleveland--appreciate recs, will arrange for HD while 

inpatient


-verified Fosrenol dosing is 1000 mg TID per Dr. Cleveland


-continue other HD meds





#Hx of HTN


-patient has hx of being borderline hypotensive in clinic


-takes midodrine on HD days, will continue while in hospital


-no HTN meds at this time





#Neuropathy


-hold pregabalin for now, can consider re-added in am if patient more alert





#Asthma


-continue home medication regimen








Diet: Renal High protein


DVT ppx: heparin


CODE: Full


PCP: Jorge Luis








Dispo: Guarded, admitted to inpatient in IMCU for continued respiratory 

monitoring and support with BIPAP. Restart antibiotics at direction of 

Pulmonology, appreciate recs. Resume scheduled dialysis under direction of N

ephrology, appreciate recs. 





Addendum - Attending





- Attending Attestation


Date/Time: 02/17/21 0983





I personally evaluated the patient and discussed the management with Dr. Young.


I agree with the History, Examination, Assessment and Plan documented above with

any addition or exceptions noted below.





Arouses to voice and answers in 1 word sentences. Guarded prognosis and I feel 

intubation may be likely. Contact nephro for dialysis. Pulm/cc on board.

## 2021-02-17 NOTE — CON
DATE OF CONSULTATION:  02/17/2021



REASON FOR CONSULTATION:  The patient is on BiPAP and has altered mental status.



CONSULTING PHYSICIAN:  Family medicine residency service.



HISTORY OF PRESENT ILLNESS:  Ms. Russell is a 65-year-old female, who actually is known

to our service in the past.  She is a clinic patient of Dr. Thapa, whom he follows

for chronic persistent asthma.  The patient presented to the emergency room from

dialysis yesterday.  She apparently was having memory issues, becoming more

forgetful.  She had workup demonstrating a mild to moderate respiratory acidosis.

She was placed on BiPAP, but she does wear a CPAP at home at night. 



PAST MEDICAL HISTORY:  

1. Chronic persistent asthma.

2. End-stage renal disease, requiring hemodialysis.

3. Vitamin D deficiency.

4. RACHID.

5. Peripheral neuropathy.

6. Obesity hypoventilation syndrome.

7. Parathyroidectomy.

8. Splenectomy.

9. Bilateral carpal tunnel release.

10. Appendectomy.

11. Left upper arm AV access for dialysis.



ALLERGIES:  LEVAQUIN, LATEX, PLASTICS, BETADINE, AND ADHESIVE TAPE.



MEDICATIONS:  Prior to admission;

1. Atorvastatin 80 mg nightly.

2. Plavix 75 mg daily.

3. Vitamin B12 1000 mcg daily.

4. Midodrine 5 mg every two days.

5. Singulair 10 mg nightly.

6. Nortriptyline 50 mg nightly.

7. Lyrica 100 mg b.i.d.

8. Mysoline 100 mg b.i.d.

9. Prednisone 2.5 mg daily.

10. Budesonide nebs 0.5 mg b.i.d.

11. Calcium acetate 667 mg t.i.d.

12. Extra-strength Tylenol 650 mg every 4 hours as needed.

13. Symbicort 160/4.5 two puffs b.i.d.

14. Celebrex 200 mg b.i.d.

15. Fluticasone nasal spray.

16. Folate 1 mg daily.

17. DuoNeb twice daily as needed.

18. Fosrenol 1000 mg t.i.d.

19. ProAir HFA metered dose inhaler as needed.

20. Sensipar 60 mg daily.

21. Claritin 10 mg daily.

22. Nystatin topical solution b.i.d.

23. Protonix 40 mg b.i.d.

24. Sevelamer 2.4 g t.i.d.

25. Another prednisone prescription listed at 10 mg daily.

26. Tessalon 100 mg t.i.d.



SOCIAL HISTORY:  Nonsmoker and nondrinker.



REVIEW OF SYSTEMS:  Cannot be obtained as patient is obtunded.



PHYSICAL EXAMINATION:

VITAL SIGNS:  Temperature 98.0, pulse rate 78, blood pressure 121/63, and O2

saturation 98%.  The patient is currently on mechanical ventilation, seems

comfortable. 

HEENT:  Pupils are 3 mm, reactive.  Gaze preference downward.  Oropharynx difficult

to assess because she is on BiPAP. 

NECK:  No JVD. 

LUNGS:  Fairly good air movement.  No wheezing. 

CARDIOVASCULAR:  S1 and S2 distant.  No murmur. 

ABDOMEN:  Soft and nontender.  She had some type of lower abdominal surgery. 

EXTREMITIES:  No clubbing or cyanosis.



LABORATORY DATA:  Sodium 142, potassium 4.9, chloride 96, CO2 of 28, BUN 38,

creatinine 7.8, glucose 83, , troponin 0.05, pH 7.25, pCO2 of 67, PO2 of 86

that was on BiPAP 14/6 with FiO2 35%.  White blood cell count 11.7, hematocrit 49.8,

and platelet count 258.  Tox-screen showed no salicylates, acetaminophen or plasma

alcohol.  COVID test is negative.  Chest x-ray shows some cardiomegaly without

evidence of profound mass, effusion, or infiltrate. 



ASSESSMENT:  This is a 65-year-old female, with multiple medical problems, who is

presenting with altered mental status.  There are several reason she could have

altered mental status; of course decompensation of her obesity hypoventilation

syndrome could be a cause, but I would expect her blood gas to be much worse than it

is.  Sepsis could be a cause.  Ingestion of medications or use of chronic sedation

medication such as antidepressants could lead to this. 



Multiple other medical problems are as listed above.



PLAN:  

1. Would recommend watching her like you are.

2. I would consider cultures if not drawn and empirically treating her for sepsis.

3. Send urine drug screen if she is able to produce urine.

4. Withhold any sedating medications.

5. I will inform Dr. Figueroa of the patient's hospitalization.







Job ID:  [id number]

## 2021-02-18 LAB
ALBUMIN SERPL BCG-MCNC: 3.2 G/DL (ref 3.4–4.8)
ALP SERPL-CCNC: 55 U/L (ref 40–110)
ALT SERPL W P-5'-P-CCNC: 10 U/L (ref 8–55)
ANALYZER IN CARDIO: (no result)
ANION GAP SERPL CALC-SCNC: 29 MMOL/L (ref 10–20)
AST SERPL-CCNC: 13 U/L (ref 5–34)
BASE EXCESS STD BLDA CALC-SCNC: -2.5 MEQ/L
BASOPHILS # BLD AUTO: 0 THOU/UL (ref 0–0.2)
BASOPHILS NFR BLD AUTO: 0.1 % (ref 0–1)
BILIRUB SERPL-MCNC: 0.5 MG/DL (ref 0.2–1.2)
BUN SERPL-MCNC: 59 MG/DL (ref 9.8–20.1)
CA-I BLDA-SCNC: 1.1 MMOL/L (ref 1.12–1.3)
CALCIUM SERPL-MCNC: 7.9 MG/DL (ref 7.8–10.44)
CHLORIDE SERPL-SCNC: 97 MMOL/L (ref 98–107)
CO2 SERPL-SCNC: 18 MMOL/L (ref 23–31)
CREAT CL PREDICTED SERPL C-G-VRATE: 9 ML/MIN (ref 70–130)
EOSINOPHIL # BLD AUTO: 0.2 THOU/UL (ref 0–0.7)
EOSINOPHIL NFR BLD AUTO: 1.7 % (ref 0–10)
GLOBULIN SER CALC-MCNC: 3.1 G/DL (ref 2.4–3.5)
GLUCOSE SERPL-MCNC: 49 MG/DL (ref 80–115)
HBSAG INDEX: 0.19 S/CO (ref 0–0.99)
HCO3 BLDA-SCNC: 23.9 MEQ/L (ref 22–28)
HCT VFR BLDA CALC: 43 % (ref 36–47)
HGB BLD-MCNC: 14.7 G/DL (ref 12–16)
HGB BLDA-MCNC: 14.6 G/DL (ref 12–16)
LYMPHOCYTES # BLD: 2.1 THOU/UL (ref 1.2–3.4)
LYMPHOCYTES NFR BLD AUTO: 16.7 % (ref 21–51)
MCH RBC QN AUTO: 32.5 PG (ref 27–31)
MCV RBC AUTO: 104 FL (ref 78–98)
MDIFF COMPLETE?: YES
MONOCYTES # BLD AUTO: 1.7 THOU/UL (ref 0.11–0.59)
MONOCYTES NFR BLD AUTO: 13.5 % (ref 0–10)
NEUTROPHILS # BLD AUTO: 8.6 THOU/UL (ref 1.4–6.5)
NEUTROPHILS NFR BLD AUTO: 68 % (ref 42–75)
O2 A-A PPRESDIFF RESPIRATORY: 57.34 MMHG (ref 0–20)
PCO2 BLDA: 47.2 MMHG (ref 35–45)
PH BLDA: 7.32 [PH] (ref 7.35–7.45)
PLATELET # BLD AUTO: 293 THOU/UL (ref 130–400)
PO2 BLDA: 83.3 MMHG (ref 80–?)
POTASSIUM BLD-SCNC: 3.79 MMOL/L (ref 3.7–5.3)
POTASSIUM SERPL-SCNC: 5.3 MMOL/L (ref 3.5–5.1)
RBC # BLD AUTO: 4.51 MILL/UL (ref 4.2–5.4)
SODIUM SERPL-SCNC: 139 MMOL/L (ref 136–145)
SPECIMEN DRAWN FROM PATIENT: (no result)
VANCOMYCIN SERPL-MCNC: 21.9 UG/ML
WBC # BLD AUTO: 12.7 THOU/UL (ref 4.8–10.8)

## 2021-02-18 PROCEDURE — 5A1D70Z PERFORMANCE OF URINARY FILTRATION, INTERMITTENT, LESS THAN 6 HOURS PER DAY: ICD-10-PCS | Performed by: FAMILY MEDICINE

## 2021-02-18 RX ADMIN — LANTHANUM CARBONATE SCH MG: 500 TABLET, CHEWABLE ORAL at 10:52

## 2021-02-18 RX ADMIN — HEPARIN SODIUM SCH UNITS: 5000 INJECTION, SOLUTION INTRAVENOUS; SUBCUTANEOUS at 14:11

## 2021-02-18 RX ADMIN — HYDROCORTISONE SODIUM SUCCINATE SCH MG: 100 INJECTION, POWDER, FOR SOLUTION INTRAMUSCULAR; INTRAVENOUS at 21:23

## 2021-02-18 RX ADMIN — MOMETASONE FUROATE AND FORMOTEROL FUMARATE DIHYDRATE SCH PUFF: 200; 5 AEROSOL RESPIRATORY (INHALATION) at 18:22

## 2021-02-18 RX ADMIN — MOMETASONE FUROATE AND FORMOTEROL FUMARATE DIHYDRATE SCH PUFF: 200; 5 AEROSOL RESPIRATORY (INHALATION) at 12:20

## 2021-02-18 RX ADMIN — HEPARIN SODIUM SCH UNITS: 5000 INJECTION, SOLUTION INTRAVENOUS; SUBCUTANEOUS at 21:23

## 2021-02-18 RX ADMIN — CEFTRIAXONE SCH MLS: 1 INJECTION, POWDER, FOR SOLUTION INTRAMUSCULAR; INTRAVENOUS at 08:04

## 2021-02-18 RX ADMIN — LANTHANUM CARBONATE SCH MG: 500 TABLET, CHEWABLE ORAL at 16:49

## 2021-02-18 RX ADMIN — HEPARIN SODIUM SCH UNITS: 5000 INJECTION, SOLUTION INTRAVENOUS; SUBCUTANEOUS at 08:07

## 2021-02-18 RX ADMIN — LANTHANUM CARBONATE SCH MG: 500 TABLET, CHEWABLE ORAL at 08:09

## 2021-02-18 RX ADMIN — PANTOPRAZOLE SODIUM SCH MG: 40 GRANULE, DELAYED RELEASE ORAL at 08:08

## 2021-02-18 RX ADMIN — CYANOCOBALAMIN TAB 1000 MCG SCH MCG: 1000 TAB at 08:22

## 2021-02-18 RX ADMIN — HYDROCORTISONE SODIUM SUCCINATE SCH MG: 100 INJECTION, POWDER, FOR SOLUTION INTRAMUSCULAR; INTRAVENOUS at 12:00

## 2021-02-18 NOTE — PRG
DATE OF SERVICE:  02/18/2021



SUBJECTIVE:  Lori Russell is still confused.  She recognized me when I walked into the

room and she does not recall coming to the hospital. 



OBJECTIVE:  VITAL SIGNS:  Blood pressure 123/75, heart rate is 94, respiratory rate

is 21. 

LUNGS:  Clear anteriorly. 

HEART:  Regular rhythm. 

ABDOMEN:  Soft. 



She says she feels like she is having active asthma, although I do not really hear

much in the way of wheezes.  She is chronically steroid dependent, so in theory,

some of her confusion could be actually adrenal crisis. 



I will start her on some hydrocortisone now if we see an improvement. 



Cultures are negative so far. 



She will remain in the critical care unit.







Job ID:  [id number]

## 2021-02-18 NOTE — PDOC.FM
- Subjective


Subjective: 





Patient feeling better this morning, is more alert. Oriented to person and 

place. States she wants to take her BIPAP mask off and go home. Patient 

counseled that she needs to have dialysis done today and she is agreeable to 

this. Denies any pain or tremors at this time. 





- Objective


MAR Reviewed: Yes


Vital Signs & Weight: 


                             Vital Signs (12 hours)











  Temp Pulse Pulse Ox


 


 02/18/21 07:18    97


 


 02/18/21 07:00  98.7 F  


 


 02/18/21 04:00  98.3 F  


 


 02/18/21 02:17   87 


 


 02/18/21 00:00  99.0 F  


 


 02/17/21 22:00   79 


 


 02/17/21 20:00  97.9 F   95








                                     Weight











Admit Weight                   125.2 g


 


Weight                         123.3 kg











                            Most Recent Monitor Data











Heart Rate from ECG            81


 


NIBP                           116/75


 


NIBP BP-Mean                   88


 


Respiration from ECG           16


 


SpO2                           98














I&O: 


                                        











 02/17/21 02/18/21 02/19/21





 06:59 06:59 06:59


 


Intake Total 0 738 0


 


Output Total 0 70 70


 


Balance 0 668 -70











Result Diagrams: 


                                 02/18/21 06:49





                                 02/17/21 04:08





Phys Exam





- Physical Examination


Constitutional: NAD


morbidly obese, speaks in short phrases


HEENT: moist MMs, sclera anicteric


Neck: supple


Respiratory: clear to auscultation bilateral


Cardiovascular: RRR, no significant murmur


Gastrointestinal: soft, no distention


Musculoskeletal: pulses present


Psychiatric: normal affect


Skin: no rash, normal turgor





Dx/Plan


(1) Metabolic encephalopathy


Code(s): G93.41 - METABOLIC ENCEPHALOPATHY   Status: Acute   





(2) Acute hypercapnic respiratory failure


Code(s): J96.02 - ACUTE RESPIRATORY FAILURE WITH HYPERCAPNIA   Status: Acute   





(3) ESRD (end stage renal disease) on dialysis


Code(s): N18.6 - END STAGE RENAL DISEASE; Z99.2 - DEPENDENCE ON RENAL DIALYSIS  

Status: Chronic   





(4) RACHID on CPAP


Code(s): G47.33 - OBSTRUCTIVE SLEEP APNEA (ADULT) (PEDIATRIC); Z99.89 - 

DEPENDENCE ON OTHER ENABLING MACHINES AND DEVICES   Status: Chronic   





(5) Obesity hypoventilation syndrome


Code(s): E66.2 - MORBID (SEVERE) OBESITY WITH ALVEOLAR HYPOVENTILATION   Status:

Chronic   





- Plan


Plan: 


Patient is a 65F with PMHx of ESRD on HD (Tues/Th/Sat with Dr. Cleveland), HTN, GERD, 

Asthma, vitamin D deficiency, neuropathy, and sleep apnea (uses CPAP at night) 

admitted for:





#Metabolic encephalopathy likely 2/2 acute hypercapneic respiratory failure, 

possibly 2/2 community acquired pna


#Obesity Hypoventilation Syndrome


#RACHID


-patient transferred to ED from HD for AMS, patient is A&O x3/4 at baseline


-has had worsening repetitive speech and worsening tremors over the last week


   -currently no tremor in last 24 hours


-BUN 38, has been similar in the past and patient has not missed any of her hung 

HD appts


-ABG in ED: pH 7.25, pCO2 66.2, pO2 104.8, 98.4%; patient placed on bipap for 

hypercapnia


-repeat ABG 2 hours later in ED pH 7.25, pCO2 67.2, pO2 86.5, 96.2%, patient re-

evaluated and answered questions appropriately without somnolence


-continue BIPAP for respiratory support--typically wears CPAP at night, will pe

rform trial on N/C this morning and repeat ABG after 2-3 hours


-patient has not been wearing her home O2 for the past month but has been using 

her night-time CPAP


-COVID/flu negative


-CXR demonstrates cardiomegaly, prominence of the pulmonary vasculature, and 

lower lung haziness suggestive of possible early pna


-received 2g rocephin and 1g vanc in the ED--will continue Rocephin & Vancomycin

for coverage


-procal 0.46


-Ammonia 50


-serum drug screen negative, unable to perform UDS


-will d/c topamax for now as this can be associated with increased drowsiness 

and tremors


-will hold pregabalin as association with drowsiness


-consult Pulmonology, Dr. Landa--appreciate recs





#Worsening resting tremors


-worsening over the last week, none apparent on exam this morning


-patient of Dr. Resendez, has next appointment in March


-CT/MRI of brain within the past few months, see HPI


-on primidone at home


-can consider consulting neurology if tremors worsening





#Elevated troponin


-trop 0.051 > 0.058 > 0.049


-EKG demonstrates no ST changes, patient denies chest pain





#ESRD on HD


-patient has HD tues/thurs/sat, had 3hrs of HD 2/16 before being transferred to 

the ED


-Consult Nephrologist, Dr. Cleveland--appreciate recs, will arrange for HD while 

inpatient


-verified Fosrenol dosing is 1000 mg TID per Dr. Cleveland


-continue other HD meds





#Hx of HTN


-patient has hx of being borderline hypotensive in clinic


-takes midodrine on HD days, will continue while in hospital


-no HTN meds at this time





#Neuropathy


-hold pregabalin for now, can consider re-added in am if patient more alert





#Asthma


-continue home medication regimen








Diet: Renal High protein


DVT ppx: heparin


CODE: Full


PCP: Jorge Luis








Dispo: Stable, admitted to inpatient in IMCU for continued respiratory 

monitoring and support. Pulmonology & Nephrology consulted, appreciate recs. 

Resume scheduled dialysis today. Trial of N/C this morning.  








Addendum - Attending





- Attending Attestation


Date/Time: 02/18/21 0901





I personally evaluated the patient and discussed the management with Dr. Young.


I agree with the History, Examination, Assessment and Plan documented above with

any addition or exceptions noted below.





Looking better this morning. Will trial off BiPAP. Dialysis today.

## 2021-02-18 NOTE — PQF
CLINICAL DOCUMENTATION CLARIFICATION FORM:









Dear Dr. SAWYER MATHIS                        Date: 2-18-21

Please exercise your independent, professional judgment in responding to the 
clarification form. 

Clinical indicators are provided on the bottom of this form for your review.



Please check appropriate box(es) to clarify if the following diagnosis has been 
ruled in our ruled out:

                                                                 SEPSIS

 [ ] Ruled in diagnosis

     [  ] Continue to treat        [  ] Resolved

[ x ] Ruled out diagnosis

 [  ] Other diagnosis ___________

[  ] Unable to determine



In addition, please specify:

Present on Admission (POA):  [ x ] Yes             [  ] No             [  ] 
Unable to determine



For continuity of documentation, please document condition throughout progress 
notes and discharge summary.  Thank You.



To be completed by CDI/Coding staff for physician review: 

CLINICAL INDICATORS - SIGNS / SYMPTOMS / LABS  / RESULTS AND LOCATION IN MR:



ER DX:   2-16-21:   AMS,  METABOLIC ENCEPHALOPATHY,  SEPSIS 2/2 PNA



ER NOTES 2-16-21:   PULSE:  106, BP:   75/60, 94/67,  RR:   30,  23,  27



WBC:  2-16-21:   13.0,   11.7,  12.7



H&P:   2-16-21:    METABOLIC ENCEPHALOPATHY LIKELY 2/2 ACUTE HYPERCAPNIC 
RESPIRATORY FAILURE,  POSSIBLY 2/2 COMMUNITY ACQUIRED PNA



RISK FACTORS   / RESULTS AND LOCATION IN MR:



H&P:   2-16-21:    METABOLIC ENCEPHALOPATHY LIKELY 2/2 ACUTE HYPERPNEIC 
RESPIRATORY FAILURE,  POSSIBLY 2/2 COMMUNITY ACQUIRED PNA



TREATMENTS    / RESULTS AND LOCATION IN MR:



ER NOTES 2-18-21:    VANCOMYCIN IV,  NS IVF,  CEFTRIAXONE IV





CDS Signature:    Deloris Randyrubin    Phone #:  [phone]         Date: 2-18-21



                 This is a permanent part of the Medical Record

[facility]

## 2021-02-18 NOTE — PRG
DATE OF SERVICE:  02/18/2021



SUBJECTIVE:  Ms. Russell is a 65-year-old  female with ESRD, currently on

maintenance hemodialysis.  We are following her up for management of her ESRD.  The

patient is undergoing a 3 hour and 15 minute dialysis today.  Attempting a 3.5 L of

fluid removal.  The patient was initially admitted for mental status change.  Lyrica

is now currently on hold.  No other complaints.  No chest pain or shortness of

breath. 



OBJECTIVE:  VITAL SIGNS:  Blood pressure 112/61, heart rate 94, respiratory rate 17,

O2 saturation 96%. 

GENERAL: Awake, supine, comfortable, obese, not in distress. 

SKIN: Adequate turgor. 

HEENT: She has pinkish conjunctivae.  Anicteric sclerae.  No neck mass.  No carotid

bruits.  No JVD. 

CHEST:  No deformities. 

LUNGS: Clear breath sounds.  No wheezing.  No crackles. 

HEART: Normal sinus rhythm.  No murmur.  No gallops.  No rubs.  ABDOMEN: Globular,

soft, nontender. No masses. 

EXTREMITIES: No edema.  No deformities.



MEDICATIONS:  On February 18, 2021, was reviewed.



LABORATORY DATA:  On February 18, 2021 white count 12.7, hemoglobin 14.7.  Sodium

139, potassium 5.3, chloride 97, carbon dioxide 18, BUN 59, creatinine 11.67,

glucose 49, calcium 7.9, AST 13, ALT 10, albumin 3.2. 



ASSESSMENT AND PLAN:  

1. End-stage renal disease, stable.  We will continue current hemodialysis regimen.

Currently undergoing 3 hour and 15 minute dialysis.  Attempting 3.5 L of fluid

removal as tolerated by the patient. 

2. Mental status change-this could have been drug-induced from the Lyrica.

Currently on hold.  She probably could go back to a lower dose of earlier for her

neuropathy. 

3. Chronic anemia.  No indication for any Epogen.

4. Agree with current management.







Job ID:  [id number]

## 2021-02-19 RX ADMIN — HYDROCORTISONE SODIUM SUCCINATE SCH MG: 100 INJECTION, POWDER, FOR SOLUTION INTRAMUSCULAR; INTRAVENOUS at 03:21

## 2021-02-19 RX ADMIN — HYDROCORTISONE SODIUM SUCCINATE SCH MG: 100 INJECTION, POWDER, FOR SOLUTION INTRAMUSCULAR; INTRAVENOUS at 12:15

## 2021-02-19 RX ADMIN — LANTHANUM CARBONATE SCH MG: 500 TABLET, CHEWABLE ORAL at 12:15

## 2021-02-19 RX ADMIN — MOMETASONE FUROATE AND FORMOTEROL FUMARATE DIHYDRATE SCH PUFF: 200; 5 AEROSOL RESPIRATORY (INHALATION) at 06:44

## 2021-02-19 RX ADMIN — LANTHANUM CARBONATE SCH MG: 500 TABLET, CHEWABLE ORAL at 08:37

## 2021-02-19 RX ADMIN — HYDROCORTISONE SODIUM SUCCINATE SCH MG: 100 INJECTION, POWDER, FOR SOLUTION INTRAMUSCULAR; INTRAVENOUS at 22:13

## 2021-02-19 RX ADMIN — HEPARIN SODIUM SCH UNITS: 5000 INJECTION, SOLUTION INTRAVENOUS; SUBCUTANEOUS at 22:14

## 2021-02-19 RX ADMIN — CYANOCOBALAMIN TAB 1000 MCG SCH MCG: 1000 TAB at 08:51

## 2021-02-19 RX ADMIN — HEPARIN SODIUM SCH UNITS: 5000 INJECTION, SOLUTION INTRAVENOUS; SUBCUTANEOUS at 15:59

## 2021-02-19 RX ADMIN — CEFTRIAXONE SCH MLS: 1 INJECTION, POWDER, FOR SOLUTION INTRAMUSCULAR; INTRAVENOUS at 08:34

## 2021-02-19 RX ADMIN — MOMETASONE FUROATE AND FORMOTEROL FUMARATE DIHYDRATE SCH: 200; 5 AEROSOL RESPIRATORY (INHALATION) at 19:44

## 2021-02-19 RX ADMIN — LANTHANUM CARBONATE SCH MG: 500 TABLET, CHEWABLE ORAL at 17:40

## 2021-02-19 RX ADMIN — HEPARIN SODIUM SCH UNITS: 5000 INJECTION, SOLUTION INTRAVENOUS; SUBCUTANEOUS at 08:37

## 2021-02-19 NOTE — PRG
DATE OF SERVICE:  



I have examined Ms. Russell.  I have discussed the case with Dr. Lotus Young, and I

agree with her assessment and plan. 







Job ID:  [id number]

## 2021-02-19 NOTE — PRG
DATE OF SERVICE:  02/19/2021



SUBJECTIVE:  Ms. Russell is a 65-year-old  female with ESRD and followed up
by

the Renal Service for management of her ESRD.  The patient was initially 
admitted

for mental status change.  The feeling is that this could be related to previous
dose of her LYrica and this has been

discontinued.  Mentation is much improved.  She underwent hemodialysis yesterday

without any difficulty.  The patient voices no new complaints today.  No chest 
pain

or shortness of breath. 



OBJECTIVE:  VITAL SIGNS:  Blood pressure is noted at 108/61, heart rate 77,

respiratory rate 20, and O2 saturation 100%. 

GENERAL:  The patient is awake, supine, comfortable, obese, not in distress. 

SKIN:  Adequate turgor. 

HEENT:  She has pinkish conjunctivae.  Anicteric sclerae. 

NECK:  No neck mass.  No carotid bruits.  No JVD. 

CHEST:  No deformities. 

LUNGS:  Decreased breath sounds. 

HEART:  Normal sinus rhythm.  No murmurs.  No gallops.  No rubs. 

ABDOMEN:  Globular, soft, and nontender.  No masses. 

EXTREMITIES:  Trace edema.



MEDICATIONS:  Of February 19, 2021, reviewed.



LABORATORY DATA:  Laboratories of February 18, 2021; white count 12.7, 
hemoglobin

14.7.  Sodium 139, potassium 5.3, chloride 97, carbon dioxide 18, BUN 59, 
creatinine

11.67, AST 13, ALT 10, and albumin 3.2. 



ASSESSMENT AND PLAN:  

1. End-stage renal disease, stable.  We will continue current Tuesday, Thursday,
and

Saturday hemodialysis regimen.  No indication for any acute dialysis today.  The

patient is not overtly in volume overload.  Potassium is acceptable. 

2. Mental status change - much improved after discontinuation of Lyrica.  We may

need to restart Lyrica at a lower dose. 



Agree with current management.







Job ID:  [id number]



[facility]

## 2021-02-19 NOTE — PDOC.FM
- Subjective


Subjective: 





Patient feeling well this morning. States she wants to take the BIPAP mask off 

due to discomfort. States breathing is "okay". Denies any pain or tremors. Is 

alert, oriented to person, place, month. I spoke to her nephew Davion yesterday 

(primary caregiver) and he states that patient has been taking Lyrica for her 

neuropathy although she was told to previously try to cut back on use. He also 

states that ever since starting Topamax about 2-3 weeks ago that the patient 

"has not been herself" and has been more confused than usual. 








- Objective


MAR Reviewed: Yes


Vital Signs & Weight: 


                             Vital Signs (12 hours)











  Temp Pulse Resp Pulse Ox


 


 02/19/21 07:45   62   100


 


 02/19/21 07:20     100


 


 02/19/21 07:00  98.4 F   


 


 02/19/21 06:44   62  20  100


 


 02/19/21 04:00  98.5 F   


 


 02/19/21 02:42   89  


 


 02/18/21 23:00  98.7 F   


 


 02/18/21 22:01   93  18  99


 


 02/18/21 20:00     100








                                     Weight











Admit Weight                   125.2 g


 


Weight                         123.3 kg











                            Most Recent Monitor Data











Heart Rate from ECG            66


 


NIBP                           172/80


 


NIBP BP-Mean                   110


 


Respiration from ECG           19


 


SpO2                           100














I&O: 


                                        











 02/18/21 02/19/21 02/20/21





 06:59 06:59 06:59


 


Intake Total 738 1173 


 


Output Total 70 280 0


 


Balance 668 893 0











Result Diagrams: 


                                 02/18/21 06:49





                                 02/18/21 09:03





Phys Exam





- Physical Examination


Constitutional: NAD


morbidly obese


HEENT: moist MMs, sclera anicteric


Neck: supple


Respiratory: no wheezing, clear to auscultation bilateral


Cardiovascular: RRR, no significant murmur


Gastrointestinal: soft, no distention


Musculoskeletal: pulses present


Psychiatric: normal affect, A&O x 3


Skin: no rash





Dx/Plan


(1) Metabolic encephalopathy


Code(s): G93.41 - METABOLIC ENCEPHALOPATHY   Status: Acute   





(2) Acute hypercapnic respiratory failure


Code(s): J96.02 - ACUTE RESPIRATORY FAILURE WITH HYPERCAPNIA   Status: Acute   





(3) ESRD (end stage renal disease) on dialysis


Code(s): N18.6 - END STAGE RENAL DISEASE; Z99.2 - DEPENDENCE ON RENAL DIALYSIS  

Status: Chronic   





(4) RACHID on CPAP


Code(s): G47.33 - OBSTRUCTIVE SLEEP APNEA (ADULT) (PEDIATRIC); Z99.89 - 

DEPENDENCE ON OTHER ENABLING MACHINES AND DEVICES   Status: Chronic   





(5) Obesity hypoventilation syndrome


Code(s): E66.2 - MORBID (SEVERE) OBESITY WITH ALVEOLAR HYPOVENTILATION   Status:

Chronic   





- Plan


Plan: 





Patient is a 65F with PMHx of ESRD on HD (Tues/Th/Sat with Dr. Cleveland), HTN, GERD, 

Asthma, vitamin D deficiency, neuropathy, and sleep apnea (uses CPAP at night) 

admitted for:





#Metabolic encephalopathy likely 2/2 acute hypercapneic respiratory failure


#Obesity Hypoventilation Syndrome


#RACHID


-patient transferred to ED from HD for AMS, patient is A&O x3/4 at baseline


-has had worsening repetitive speech and worsening tremors over the last week; 

patient has not been wearing her home O2 for the past month but has been using 

her night-time CPAP


   -currently no tremor in last 48 hours


-BUN 38, has been similar in the past and patient has not missed any of her [facility] 

HD appts


-ABG in ED: pH 7.25, pCO2 66.2, pO2 104.8, 98.4%; patient placed on bipap for 

hypercapnia


-repeat ABG 2 hours later in ED pH 7.25, pCO2 67.2, pO2 86.5, 96.2%, patient re-

evaluated and answered questions appropriately without somnolence


-continue BIPAP/CPAP for respiratory support at night, transition to N/C during 

the daytime as tolerated


-COVID/flu negative


-CXR demonstrates cardiomegaly, prominence of the pulmonary vasculature, and 

lower lung haziness suggestive of possible early pna


-received 2g rocephin and 1g vanc in the ED--will continue Rocephin & Vancomycin

for coverage


   -cultures negative so far


-procal 0.46


-Ammonia 50


-serum drug screen negative, unable to perform UDS


-will d/c topamax for now as this can be associated with increased drowsiness 

and tremors


-will hold pregabalin as association with drowsiness


-consult Pulmonology, Dr. Landa & Dr. Figueroa--appreciate recs


   -started Hydrocortisone on 2/18





#Worsening resting tremors


-worsening over the last week, none apparent on exam this morning


-patient of Dr. Resendez, has next appointment in March


-CT/MRI of brain within the past few months, see HPI


-on primidone at home


-can consider consulting neurology if tremors worsening





#Elevated troponin


-trop 0.051 > 0.058 > 0.049


-EKG demonstrates no ST changes, patient denies chest pain





#ESRD on HD


-patient has HD tues/thurs/sat, had 3hrs of HD 2/16 before being transferred to 

the ED


-Consult Nephrologist, Dr. Cleveland--appreciate recs, will arrange for HD while 

inpatient


-verified Fosrenol dosing is 1000 mg TID per Dr. Cleveland


-continue other HD meds





#Hx of HTN


-patient has hx of being borderline hypotensive in clinic


-takes midodrine on HD days, will continue while in hospital


-no HTN meds at this time





#Neuropathy


-hold pregabalin for now, can consider re-added in am if patient more alert





#Asthma


-continue home medication regimen








Diet: Renal High protein


DVT ppx: heparin


CODE: Full


PCP: Jorge Luis








Dispo: Stable, admitted to inpatient in IM for continued respiratory 

monitoring and support. Pulmonology & Nephrology consulted, appreciate recs. 

Consider transition back as tolerated to CPAP at night, N/C during day as this 

is patient's home regimen given patient's current improved mentation.

## 2021-02-19 NOTE — PRG
DATE OF SERVICE:  02/19/2021



SUBJECTIVE:  Lori Russell is more appropriate today.  She is more talkative. 



Her hemodynamics are stable.



OBJECTIVE:  LUNGS:  Clear. 

HEART:  Regular rhythm. 

ABDOMEN:  Soft. 

EXTREMITIES:  Unchanged.



LABORATORY DATA:  Sodium 139, potassium 5.3, chloride 97, bicarb 18, BUN 59,

creatinine 11.6, glucose __________.  Hemoglobin 14.7. 



IMPRESSION:  

1. Altered mental status on presentation, ? early sepsis versus adrenal crisis.  She

appears to be improved today, although __________ with the steroids obviously can be

proven.  Since she is steroid dependent, there is no reason to do an ACTH

stimulation test. 

2. Other problems include asthma, sleep apnea (compliant with CPAP therapy at home),

peripheral neuropathy. 

3. Obesity.

4. Deconditioning, essentially wheel-chair bound.

5. End-stage renal disease, on dialysis.  She will be transferred most likely out of

Critical Care Unit today.  Her asthma does not appear to be a problem.  She remains

stable.  She can be switched to p.o. steroids tomorrow.  She is asking for a stool

softener, but had a very large bowel movement this morning. 







Job ID:  [id number]

## 2021-02-20 LAB
ANION GAP SERPL CALC-SCNC: 22 MMOL/L (ref 10–20)
BASOPHILS # BLD AUTO: 0 THOU/UL (ref 0–0.2)
BASOPHILS NFR BLD AUTO: 0.4 % (ref 0–1)
BUN SERPL-MCNC: 49 MG/DL (ref 9.8–20.1)
CALCIUM SERPL-MCNC: 8.6 MG/DL (ref 7.8–10.44)
CHLORIDE SERPL-SCNC: 96 MMOL/L (ref 98–107)
CO2 SERPL-SCNC: 24 MMOL/L (ref 23–31)
CREAT CL PREDICTED SERPL C-G-VRATE: 10 ML/MIN (ref 70–130)
EOSINOPHIL # BLD AUTO: 0 THOU/UL (ref 0–0.7)
EOSINOPHIL NFR BLD AUTO: 0.1 % (ref 0–10)
GLUCOSE SERPL-MCNC: 104 MG/DL (ref 80–115)
HGB BLD-MCNC: 14.5 G/DL (ref 12–16)
LYMPHOCYTES # BLD: 1 THOU/UL (ref 1.2–3.4)
LYMPHOCYTES NFR BLD AUTO: 10.8 % (ref 21–51)
MCH RBC QN AUTO: 32.7 PG (ref 27–31)
MCV RBC AUTO: 102 FL (ref 78–98)
MONOCYTES # BLD AUTO: 0.5 THOU/UL (ref 0.11–0.59)
MONOCYTES NFR BLD AUTO: 5.7 % (ref 0–10)
NEUTROPHILS # BLD AUTO: 7.7 THOU/UL (ref 1.4–6.5)
NEUTROPHILS NFR BLD AUTO: 83 % (ref 42–75)
PLATELET # BLD AUTO: 310 THOU/UL (ref 130–400)
POTASSIUM SERPL-SCNC: 5 MMOL/L (ref 3.5–5.1)
RBC # BLD AUTO: 4.43 MILL/UL (ref 4.2–5.4)
SODIUM SERPL-SCNC: 137 MMOL/L (ref 136–145)
VANCOMYCIN SERPL-MCNC: 17.3 UG/ML
WBC # BLD AUTO: 9.2 THOU/UL (ref 4.8–10.8)

## 2021-02-20 RX ADMIN — MOMETASONE FUROATE AND FORMOTEROL FUMARATE DIHYDRATE SCH PUFF: 200; 5 AEROSOL RESPIRATORY (INHALATION) at 19:17

## 2021-02-20 RX ADMIN — LANTHANUM CARBONATE SCH MG: 500 TABLET, CHEWABLE ORAL at 17:31

## 2021-02-20 RX ADMIN — HEPARIN SODIUM SCH UNITS: 5000 INJECTION, SOLUTION INTRAVENOUS; SUBCUTANEOUS at 21:43

## 2021-02-20 RX ADMIN — HYDROCORTISONE SODIUM SUCCINATE SCH MG: 100 INJECTION, POWDER, FOR SOLUTION INTRAMUSCULAR; INTRAVENOUS at 03:55

## 2021-02-20 RX ADMIN — CEFTRIAXONE SCH: 1 INJECTION, POWDER, FOR SOLUTION INTRAMUSCULAR; INTRAVENOUS at 08:41

## 2021-02-20 RX ADMIN — HEPARIN SODIUM SCH: 5000 INJECTION, SOLUTION INTRAVENOUS; SUBCUTANEOUS at 16:05

## 2021-02-20 RX ADMIN — LANTHANUM CARBONATE SCH MG: 500 TABLET, CHEWABLE ORAL at 08:42

## 2021-02-20 RX ADMIN — HYDROCORTISONE SODIUM SUCCINATE SCH MG: 100 INJECTION, POWDER, FOR SOLUTION INTRAMUSCULAR; INTRAVENOUS at 12:56

## 2021-02-20 RX ADMIN — MOMETASONE FUROATE AND FORMOTEROL FUMARATE DIHYDRATE SCH PUFF: 200; 5 AEROSOL RESPIRATORY (INHALATION) at 07:11

## 2021-02-20 RX ADMIN — LANTHANUM CARBONATE SCH MG: 500 TABLET, CHEWABLE ORAL at 12:57

## 2021-02-20 RX ADMIN — CYANOCOBALAMIN TAB 1000 MCG SCH MCG: 1000 TAB at 08:42

## 2021-02-20 RX ADMIN — HEPARIN SODIUM SCH UNITS: 5000 INJECTION, SOLUTION INTRAVENOUS; SUBCUTANEOUS at 08:42

## 2021-02-20 NOTE — EKG
Test Reason : 

Blood Pressure : ***/*** mmHG

Vent. Rate : 097 BPM     Atrial Rate : 097 BPM

   P-R Int : 168 ms          QRS Dur : 088 ms

    QT Int : 364 ms       P-R-T Axes : 035 -22 081 degrees

   QTc Int : 462 ms

 

Normal sinus rhythm

Low voltage QRS

Borderline ECG

 

Confirmed by VAN SANTACRUZ ([id number]),  DEEPAK HATFIELD ([id number]) on 2/20/2021 3:54:17 PM

 

Referred By:             Confirmed By:VAN SANTACRUZ

## 2021-02-20 NOTE — PDOC.FM
- Subjective


Subjective: 





Pt awake and alert, getting nebulizer treatment with RT. States her mentation is

much improved but she has feelings of guilt for how she treated the staff during

her confusion. She says she lives with her nephew and he helps take care of her.

She wears her cpap at night when at home but she has not been regularly wearing 

her O2 during the day. She says she has not had a HA since being in hospital. 

Tolerating diet. No N/V, SOB.





- Objective


Vital Signs & Weight: 


                             Vital Signs (12 hours)











  Temp Pulse Resp BP Pulse Ox


 


 02/20/21 04:00  97.7 F  70  22 H  135/82  93 L


 


 02/19/21 20:00  98.6 F     96








                                     Weight











Admit Weight                   122.924 kg


 


Weight                         123.3 kg











                            Most Recent Monitor Data











Heart Rate from ECG            81


 


NIBP                           133/78


 


NIBP BP-Mean                   96


 


Respiration from ECG           19


 


SpO2                           96














I&O: 


                                        











 02/18/21 02/19/21 02/20/21





 06:59 06:59 06:59


 


Intake Total 738 1173 1490


 


Output Total 70 280 0


 


Balance 











Result Diagrams: 


                                 02/20/21 06:11





                                 02/20/21 06:11





Phys Exam





- Physical Examination


Constitutional: NAD


Neck: supple, full ROM


Respiratory: no wheezing, clear to auscultation bilateral


Cardiovascular: RRR, no significant murmur


Gastrointestinal: soft, non-tender


Musculoskeletal: no edema


Neurological: non-focal, moves all 4 limbs


Psychiatric: normal affect, A&O x 3





Dx/Plan





- Plan


Plan: 





Patient is a 65F with PMHx of ESRD on HD (Tues/Th/Sat with Dr. Cleveland), HTN, GERD, 

Asthma, vitamin D deficiency, neuropathy, and sleep apnea (uses CPAP at night) 

admitted for:





#Metabolic encephalopathy likely 2/2 acute hypercapneic respiratory failure


#Obesity Hypoventilation Syndrome


#RACHID


-patient transferred to ED from HD for AMS, patient is A&O x3/4 at baseline


-has had worsening repetitive speech and worsening tremors over the last week; 

patient has not been wearing her home O2 for the past month but has been using 

her night-time CPAP


   -currently no tremor in last 48 hours


-BUN 38, has been similar in the past and patient has not missed any of her [facility] 

HD appts


-ABG in ED: pH 7.25, pCO2 66.2, pO2 104.8, 98.4%; patient placed on bipap for 

hypercapnia


-repeat ABG 2 hours later in ED pH 7.25, pCO2 67.2, pO2 86.5, 96.2%, patient re-

evaluated and answered questions appropriately without somnolence


-continue BIPAP/CPAP for respiratory support at night, transition to N/C during 

the daytime as tolerated


-COVID/flu negative


-CXR demonstrates cardiomegaly, prominence of the pulmonary vasculature, and 

lower lung haziness suggestive of possible early pna


-ABx: treated with rocephin and vanc, Dc'd on 2/20 due to no growth on BCx and 

no clinical signs of infection


-procal 0.46


-Ammonia 50


-serum drug screen negative, unable to perform UDS


-will d/c topamax for now as this can be associated with increased drowsiness 

and tremors


-will restart pregabalin today at lower dose and monitor for mental status 

changes


-consult Pulmonology, Dr. Landa & Dr. Figueroa: started Hydrocortisone on 2/18, 

likely to change to po today





#Worsening resting tremors


-worsening over the last week, none apparent on exam this morning


-patient of Dr. Resendez, has next appointment in March


-CT/MRI of brain within the past few months, pt reports results were normal


-on primidone at home


-can consider consulting neurology if tremors worsening





#Elevated troponin


-trop 0.051 > 0.058 > 0.049


-EKG demonstrates no ST changes, patient denies chest pain





#ESRD on HD


-patient has HD tues/thurs/sat, had 3hrs of HD 2/16 before being transferred to 

the ED


-Consult Nephrologist, Dr. Cleveland--appreciate recs, will arrange for HD while 

inpatient


-verified Fosrenol dosing is 1000 mg TID per Dr. Cleveland


-continue other HD meds





#Hx of HTN


-patient has hx of being borderline hypotensive in clinic


-takes midodrine on HD days, will continue while in hospital


-no HTN meds at this time





#Neuropathy


-restart lyrica at lower dose today and monitor mental status





#Asthma


-continue home medication regimen








Diet: Renal High protein


DVT ppx: heparin


CODE: Full


PCP: Jorge Luis








Dispo: Stable, admitted to medical. Pulmonology & Nephrology consulted, 

appreciate recs.








Addendum - Attending





- Attending Attestation


Date/Time: 02/20/21 2283





I personally evaluated the patient and discussed the management with Dr. Ferguson.


I agree with the History, Examination, Assessment and Plan documented above with

any addition or exceptions noted below.


Pt is improving.  Changing nebs to while awake as she refuses them at night.  

Reintroducing lyrica but at half the dose to monitor for side effects.

## 2021-02-20 NOTE — PRG
DATE OF SERVICE:  02/20/2021



SUBJECTIVE:  Ms. Russell is a 65-year-old  female with ESRD on maintenance

hemodialysis, was initially admitted secondary to mental status change.  Adjustment

of her Lyrica has been done, which improved her mentation.  She voices no complaints

of chest pain or shortness of breath.  I have scheduled her for regular dialysis

today. 



OBJECTIVE:  VITAL SIGNS:  Blood pressure 131/84, heart rate 67, respiratory rate 16,

temperature 98.4, and O2 saturation 95%. 

GENERAL:  The patient is awake, supine, comfortable, obese, not in distress. 

SKIN:  Adequate turgor. 

HEENT:  She has a pinkish conjunctivae.  Anicteric sclerae. 

NECK:  No neck mass.  No carotid bruits.  No JVD. 

CHEST:  No deformities. 

LUNGS:  Clear breath sounds.  No wheezing.  No crackles. 

HEART:  Normal sinus rhythm.  No murmur.  No gallops.  No rubs. 

ABDOMEN:  Globular, soft, and nontender.  No masses. 

EXTREMITIES:  No edema.  No deformities.



MEDICATIONS:  Medications of February 20, 2021, was reviewed.



LABORATORY DATA:  Laboratories of February 20, 2021, white count 9.2 and hemoglobin

14.5.  Sodium 137, potassium 5, chloride 96, carbon dioxide 24, BUN 49, creatinine

10.22, glucose 104, and calcium 8.6. 



ASSESSMENT AND PLAN:  

1. End-stage renal disease, stable.  We will continue current Tuesday, Thursday, and

Saturday hemodialysis regimen.  The patient has been scheduled for 3-hour

hemodialysis with fluid removal. 

2. Chronic anemia, stable.  No indication for any Epogen.

3. Mental status change, resolved with adjustment of her Lyrica.





Job ID:  [id number]

## 2021-02-20 NOTE — PRG
DATE OF SERVICE:  02/20/2021



SUBJECTIVE:  The patient is doing very well.  She is very talkative.  No complaints.



OBJECTIVE:  VITAL SIGNS:  Temperature 98.4, pulse 67, respirations 18.  O2

saturation 95% on 2 L. 

HEENT:  Unremarkable. 

NECK:  No JVD. 

LUNGS:  Clear. 

CARDIAC:  S1 and S2.  Regular. 

ABDOMEN:  Soft. 

EXTREMITIES:  No edema.



LABORATORY DATA:  White blood cell count 9.2, hematocrit 45, platelet count 310.

Sodium 137, potassium 5, BUN 49, creatinine 10.2, and glucose 104. 



ASSESSMENT:  

1. Status post acute respiratory failure related to encephalopathy, probably from

medications. 

2. Question of adrenal crisis.

3. Obstructive sleep apnea/Obesity hypoventilation syndrome.

4. End-stage renal disease, requiring dialysis.



PLAN:  She is having some medication adjustments.  Her respiratory status is stable

and I think she should be able to go home by Monday.  Dr. Figueroa will be back Monday.

 Please call if other problems develop over the weekend. 







Job ID:  [id number]

## 2021-02-21 RX ADMIN — HEPARIN SODIUM SCH UNITS: 5000 INJECTION, SOLUTION INTRAVENOUS; SUBCUTANEOUS at 21:27

## 2021-02-21 RX ADMIN — LANTHANUM CARBONATE SCH MG: 500 TABLET, CHEWABLE ORAL at 12:43

## 2021-02-21 RX ADMIN — HEPARIN SODIUM SCH UNITS: 5000 INJECTION, SOLUTION INTRAVENOUS; SUBCUTANEOUS at 08:31

## 2021-02-21 RX ADMIN — MOMETASONE FUROATE AND FORMOTEROL FUMARATE DIHYDRATE SCH PUFF: 200; 5 AEROSOL RESPIRATORY (INHALATION) at 19:09

## 2021-02-21 RX ADMIN — LANTHANUM CARBONATE SCH MG: 500 TABLET, CHEWABLE ORAL at 17:09

## 2021-02-21 RX ADMIN — CYANOCOBALAMIN TAB 1000 MCG SCH MCG: 1000 TAB at 08:30

## 2021-02-21 RX ADMIN — MOMETASONE FUROATE AND FORMOTEROL FUMARATE DIHYDRATE SCH PUFF: 200; 5 AEROSOL RESPIRATORY (INHALATION) at 07:02

## 2021-02-21 RX ADMIN — LANTHANUM CARBONATE SCH MG: 500 TABLET, CHEWABLE ORAL at 08:32

## 2021-02-21 RX ADMIN — HEPARIN SODIUM SCH UNITS: 5000 INJECTION, SOLUTION INTRAVENOUS; SUBCUTANEOUS at 17:09

## 2021-02-21 NOTE — PDOC.FM
- Subjective


Subjective: 





Pt awake and alert on exam this morning. Was started on a lower dose of lyrica 

last night with no side effects. C/o anxiety at night. She says this is 

something new for her. She was able to sleep well last night after being given 

benadryl. Tolerating diet. Working with PT. PT recommends rehab. In discussing 

this with patient, she says she has done rehab in the past and she felt it 

helped. But admits that when she went home she was not motivated and 

decompensated. Currently at home she uses a wheelchair to get around. She says 

due to her weight gain and weakness she is having to rely on her nephew more 

than she would like.





- Objective


Vital Signs & Weight: 


                             Vital Signs (12 hours)











  Temp Pulse Resp BP Pulse Ox


 


 02/21/21 04:46      97


 


 02/21/21 00:00  98.1 F  83  18  145/85 H  94 L


 


 02/20/21 21:00  98 F  75  18  149/87 H  94 L


 


 02/20/21 20:00      94 L


 


 02/20/21 19:16   68  16   95








                                     Weight











Admit Weight                   122.924 kg


 


Weight                         123.3 kg











                            Most Recent Monitor Data











Heart Rate from ECG            81


 


NIBP                           133/78


 


NIBP BP-Mean                   96


 


Respiration from ECG           19


 


SpO2                           96














I&O: 


                                        











 02/19/21 02/20/21 02/21/21





 06:59 06:59 06:59


 


Intake Total 1173 1490 


 


Output Total 280 0 


 


Balance 893 1490 











Result Diagrams: 


                                 02/20/21 06:11





                                 02/20/21 06:11





Phys Exam





- Physical Examination


Constitutional: NAD


obese


Neck: supple


Respiratory: no wheezing, clear to auscultation bilateral


Cardiovascular: RRR, no significant murmur


Gastrointestinal: soft, non-tender


Musculoskeletal: no edema


Neurological: non-focal, moves all 4 limbs


Psychiatric: normal affect, A&O x 3





Dx/Plan





- Plan


Plan: 





Patient is a 65F with PMHx of ESRD on HD (Tues/Th/Sat with Dr. Cleveland), HTN, GERD, 

Asthma, vitamin D deficiency, neuropathy, and sleep apnea (uses CPAP at night) 

admitted for:





#Metabolic encephalopathy likely 2/2 acute hypercapneic respiratory failure


#Obesity Hypoventilation Syndrome


#RACHID


-patient transferred to ED from HD for AMS, patient is A&O x3/4 at baseline


-has had worsening repetitive speech and worsening tremors over the last week; 

patient has not been wearing her home O2 for the past month but has been using 

her night-time CPAP


   -currently no tremor in last 48 hours


-BUN 38, has been similar in the past and patient has not missed any of her hung 

HD appts


-ABG in ED: pH 7.25, pCO2 66.2, pO2 104.8, 98.4%; patient placed on bipap for 

hypercapnia


-repeat ABG 2 hours later in ED pH 7.25, pCO2 67.2, pO2 86.5, 96.2%, patient re-

evaluated and answered questions appropriately without somnolence


-continue BIPAP/CPAP for respiratory support at night, transition to N/C during 

the daytime as tolerated


-COVID/flu negative


-CXR demonstrates cardiomegaly, prominence of the pulmonary vasculature, and 

lower lung haziness suggestive of possible early pna


-ABx: treated with rocephin and vanc, Dc'd on 2/20 due to no growth on BCx and 

no clinical signs of infection


-procal 0.46


-Ammonia 50


-serum drug screen negative, unable to perform UDS


-will d/c topamax for now as this can be associated with increased drowsiness 

and tremors


-started lyrica 100mg qd, which is lower than her previous dose of 100mg bid. 

Tolerated well


-consult Pulmonology, Dr. Landa & Dr. Figueroa: started Hydrocortisone on 2/18, 

likely to discharge on Monday





#Anxiety


-will start hydroxyzine qhs


-discussed outpatient f/u at our clinic for anxiety/depression meds. She thinks 

she may have been on something in the past and would be open trying a new med





#Physical Deconditioning


-due to obesity and lack of activity at home- per patient


-PT/OT- recommend rehab


-will discuss with patient 





#Worsening resting tremors


-worsening over the last week, none apparent on exam this morning


-patient of Dr. Resendez, has next appointment in March


-CT/MRI of brain within the past few months, pt reports results were normal


-on primidone at home


-can consider consulting neurology if tremors worsening





#Elevated troponin


-trop 0.051 > 0.058 > 0.049


-EKG demonstrates no ST changes, patient denies chest pain





#ESRD on HD


-patient has HD tues/thurs/sat, had 3hrs of HD 2/16 before being transferred to 

the ED


-Consult Nephrologist, Dr. Cleveland--appreciate recs, will arrange for HD while 

inpatient


-verified Fosrenol dosing is 1000 mg TID per Dr. Cleveland


-continue other HD meds





#Hx of HTN


-patient has hx of being borderline hypotensive in clinic


-takes midodrine on HD days, will continue while in hospital


-no HTN meds at this time





#Neuropathy


-restart lyrica at lower dose today and monitor mental status





#Asthma


-continue home medication regimen








Diet: Renal High protein


DVT ppx: heparin


CODE: Full


PCP: Jorge Luis








Dispo: Stable, admitted to medical. Pulmonology & Nephrology consulted, 

appreciate recs. Likely to discharge soon.





Addendum - Attending





- Attending Attestation


Date/Time: 02/21/21 1221





I personally evaluated the patient and discussed the management with Dr. Ferguson.


I agree with the History, Examination, Assessment and Plan documented above with

any addition or exceptions noted below.


Pt is very anxious at night.  Will add hydroxyzine at night  Continue supportive

care.

## 2021-02-22 RX ADMIN — LANTHANUM CARBONATE SCH MG: 500 TABLET, CHEWABLE ORAL at 13:01

## 2021-02-22 RX ADMIN — LANTHANUM CARBONATE SCH MG: 500 TABLET, CHEWABLE ORAL at 09:14

## 2021-02-22 RX ADMIN — HEPARIN SODIUM SCH UNITS: 5000 INJECTION, SOLUTION INTRAVENOUS; SUBCUTANEOUS at 14:12

## 2021-02-22 RX ADMIN — HEPARIN SODIUM SCH UNITS: 5000 INJECTION, SOLUTION INTRAVENOUS; SUBCUTANEOUS at 20:21

## 2021-02-22 RX ADMIN — MOMETASONE FUROATE AND FORMOTEROL FUMARATE DIHYDRATE SCH PUFF: 200; 5 AEROSOL RESPIRATORY (INHALATION) at 18:40

## 2021-02-22 RX ADMIN — CYANOCOBALAMIN TAB 1000 MCG SCH MCG: 1000 TAB at 07:52

## 2021-02-22 RX ADMIN — MOMETASONE FUROATE AND FORMOTEROL FUMARATE DIHYDRATE SCH PUFF: 200; 5 AEROSOL RESPIRATORY (INHALATION) at 08:59

## 2021-02-22 RX ADMIN — HEPARIN SODIUM SCH UNITS: 5000 INJECTION, SOLUTION INTRAVENOUS; SUBCUTANEOUS at 07:54

## 2021-02-22 RX ADMIN — LANTHANUM CARBONATE SCH MG: 500 TABLET, CHEWABLE ORAL at 17:40

## 2021-02-22 NOTE — PRG
DATE OF SERVICE:  02/22/2021



SUBJECTIVE:  Lori Russell is being evaluated to move over to rehab.  She is in no

distress.  Her mental status is back to its baseline. 



OBJECTIVE:  VITAL SIGNS:  She is afebrile, heart rates in the 70s, respiratory rates

in the teens, oximetry is 93% on 2 L. 

LUNGS:  Unchanged. 

HEART:  Unchanged. 

ABDOMEN:  Unchanged.



LABORATORY DATA:  Blood cultures never grew out any pathogens.



IMPRESSION:  

1. Status post presentation with encephalopathy that improved gradually.  I would

still wonder if there was some component of adrenal crisis. 

2. History of asthma.

3. Obesity.

4. Deconditioning.

5. End-stage renal disease, on dialysis. 



Other possible causes of her mentation changes were medications.  We will continue

to follow while she is in the hospital.  I do believe she is stable to go to rehab

at this time. 







Job ID:  [id number]

## 2021-02-22 NOTE — PDOC.FM
- Subjective


Subjective: 





Patient overall doing well this AM, patient is agreeable to Rehab placement. 

Reported GERD overnight treated with TUMS. Denies CP, SOB, N/V this AM.





- Objective


Vital Signs & Weight: 


                             Vital Signs (12 hours)











  Temp Pulse Resp BP Pulse Ox


 


 02/22/21 08:58   70  16   93 L


 


 02/22/21 07:28  97.7 F  74  24 H  111/71  93 L


 


 02/22/21 04:42   62   124/77  97


 


 02/22/21 01:02   74    94 L


 


 02/22/21 00:00      93 L








                                     Weight











Admit Weight                   122.924 kg


 


Weight                         123.3 kg











                            Most Recent Monitor Data











Heart Rate from ECG            81


 


NIBP                           133/78


 


NIBP BP-Mean                   96


 


Respiration from ECG           19


 


SpO2                           96














I&O: 


                                        











 02/21/21 02/22/21 02/23/21





 06:59 06:59 06:59


 


Intake Total  240 


 


Balance  240 











Result Diagrams: 


                                 02/20/21 06:11





                                 02/20/21 06:11





Phys Exam





- Physical Examination


Constitutional: NAD


Respiratory: no wheezing, no rales, no rhonchi


Cardiovascular: RRR, no significant murmur, no rub


Gastrointestinal: soft, non-tender, no distention, positive bowel sounds





Dx/Plan





- Plan


Plan: 





Patient is a 65F with PMHx of ESRD on HD (Tues/Th/Sat with Dr. Cleveland), HTN, GERD, 

Asthma, vitamin D deficiency, neuropathy, and sleep apnea (uses CPAP at night) 

admitted for:





Metabolic encephalopathy likely 2/2 acute hypercapneic respiratory failure


Obesity Hypoventilation Syndrome


RACHID


-had worsening repetitive speech and worsening tremors over the last week; 

patient has not been wearing her home O2 for the past month but has been using 

her night-time CPAP


-continue CPAP for respiratory support at night, transition to N/C during the 

daytime as tolerated


-COVID/flu negative


-ABx: treated with rocephin and vanc, Dc'd on 2/20 due to no growth on BCx and 

no clinical signs of infection


-serum drug screen negative, unable to perform UDS


-will d/c topamax for now as this can be associated with increased drowsiness 

and tremors


-started lyrica 100mg qd, which is lower than her previous dose of 100mg bid. 

Tolerated well


-consult Pulmonology, Dr. Landa & Dr. Figueroa: on 20 mg Prednisone QD





Anxiety


-will start hydroxyzine qhs


-discussed outpatient f/u at our clinic for anxiety/depression meds





Physical Deconditioning


-due to obesity and lack of activity at home- per patient


-PT/OT- recommend rehab





Worsening resting tremors


-worsening over the last week, none apparent on exam this morning


-patient of Dr. Resendze, has next appointment in March


-CT/MRI of brain within the past few months, pt reports results were normal


-on primidone at home


-can consider consulting neurology if tremors worsening





Elevated troponin


-trop 0.051 > 0.058 > 0.049


-EKG demonstrates no ST changes, patient denies chest pain





ESRD on HD


-patient has HD tues/thurs/sat, had 3hrs of HD 2/16 before being transferred to 

the ED


-Consult Nephrologist, Dr. Cleveland--appreciate recs, will arrange for HD while 

inpatient


-verified Fosrenol dosing is 1000 mg TID per Dr. Cleveland


-continue other HD meds





Hx of HTN


-patient has hx of being borderline hypotensive in clinic


-takes midodrine on HD days, will continue while in hospital


-no HTN meds at this time





Neuropathy


-restart lyrica at lower dose today and monitor mental status





Asthma


-continue home medication regimen








Diet: Renal High protein


DVT ppx: heparin


CODE: Full


PCP: Jorge Luis








Dispo: Stable, admitted to medical. Pulmonology & Nephrology consulted, 

appreciate recs. Likely to discharge soon. Will need to discuss steroid end date

with pulmonology.





Addendum - Attending





- Attending Attestation


Date/Time: 02/22/21 1051





I personally evaluated the patient and discussed the management with Dr. Leyva.




I agree with the History, Examination, Assessment and Plan documented above with

any addition or exceptions noted below.





Working on placement. Pulm recommends current dose of steroids until completes 

Rehab then decrease to 10 mg daily.

## 2021-02-22 NOTE — PRG
DATE OF SERVICE:  02/22/2021



SUBJECTIVE:  Ms. Russell is a 65-year-old  female, followed up by the [facility]

[facility] for her ESRD.  She underwent hemodialysis last Saturday and tolerated said

treatment.  The patient voices no new complaints.  We are awaiting possible rehab or

skilled nursing facility placement for this patient. 



OBJECTIVE:  VITAL SIGNS:  Blood pressure is 111/71, heart rate 74, respiratory rate

24, O2 saturation 93%, temperature 97.7. 

GENERAL:  The patient is awake, alert, comfortable, not in overt distress. 

SKIN:  Adequate turgor. 

HEENT:  Pinkish conjunctivae.  Anicteric sclerae. 

NECK:  No neck mass.  No carotid bruits.  No JVD. 

CHEST:  No deformities. 

LUNGS:  Clear breath sounds. 

HEART:  Normal sinus rhythm.  No murmur.  No gallops.  No rubs. 

ABDOMEN:  Globular, soft, nontender.  No masses. 

EXTREMITIES:  No edema.  No deformities.



MEDICATIONS:  Medications of February 22, 2021, reviewed.



LABORATORY DATA:  Laboratory of February 20, 2021; white count 9.2, hemoglobin 14.5.

 Sodium 137, potassium 5, chloride 96, carbon dioxide 24, BUN 49, creatinine 10.22,

calcium 8.6. 



ASSESSMENT AND PLAN:  

1. End-stage renal disease, stable.  No indication for any acute dialysis today.  I

have 

scheduled her back for her regular dialysis Tuesday, Thursday, Saturday.  Continue

renal diet. 

2. Mentation changes-resolved with adjustment of her Lyrica. 

We are currently awaiting rehab placement for this patient.  We will recheck CBC and

basic met in a.m. 





Job ID:  [id number]

## 2021-02-23 LAB
ANION GAP SERPL CALC-SCNC: 20 MMOL/L (ref 10–20)
BASOPHILS # BLD AUTO: 0 THOU/UL (ref 0–0.2)
BASOPHILS NFR BLD AUTO: 0.3 % (ref 0–1)
BUN SERPL-MCNC: 61 MG/DL (ref 9.8–20.1)
CALCIUM SERPL-MCNC: 8.5 MG/DL (ref 7.8–10.44)
CHLORIDE SERPL-SCNC: 94 MMOL/L (ref 98–107)
CO2 SERPL-SCNC: 26 MMOL/L (ref 23–31)
CREAT CL PREDICTED SERPL C-G-VRATE: 9 ML/MIN (ref 70–130)
EOSINOPHIL # BLD AUTO: 0.2 THOU/UL (ref 0–0.7)
EOSINOPHIL NFR BLD AUTO: 2.2 % (ref 0–10)
GLUCOSE SERPL-MCNC: 83 MG/DL (ref 80–115)
HGB BLD-MCNC: 14.2 G/DL (ref 12–16)
LYMPHOCYTES # BLD: 2.2 THOU/UL (ref 1.2–3.4)
LYMPHOCYTES NFR BLD AUTO: 20.5 % (ref 21–51)
MCH RBC QN AUTO: 33.3 PG (ref 27–31)
MCV RBC AUTO: 104 FL (ref 78–98)
MONOCYTES # BLD AUTO: 1.3 THOU/UL (ref 0.11–0.59)
MONOCYTES NFR BLD AUTO: 12.3 % (ref 0–10)
NEUTROPHILS # BLD AUTO: 7 THOU/UL (ref 1.4–6.5)
NEUTROPHILS NFR BLD AUTO: 64.8 % (ref 42–75)
PLATELET # BLD AUTO: 289 THOU/UL (ref 130–400)
POTASSIUM SERPL-SCNC: 3.9 MMOL/L (ref 3.5–5.1)
RBC # BLD AUTO: 4.27 MILL/UL (ref 4.2–5.4)
SODIUM SERPL-SCNC: 136 MMOL/L (ref 136–145)
WBC # BLD AUTO: 10.8 THOU/UL (ref 4.8–10.8)

## 2021-02-23 RX ADMIN — MOMETASONE FUROATE AND FORMOTEROL FUMARATE DIHYDRATE SCH PUFF: 200; 5 AEROSOL RESPIRATORY (INHALATION) at 07:58

## 2021-02-23 RX ADMIN — LANTHANUM CARBONATE SCH MG: 500 TABLET, CHEWABLE ORAL at 11:55

## 2021-02-23 RX ADMIN — MOMETASONE FUROATE AND FORMOTEROL FUMARATE DIHYDRATE SCH: 200; 5 AEROSOL RESPIRATORY (INHALATION) at 18:14

## 2021-02-23 RX ADMIN — HEPARIN SODIUM SCH: 5000 INJECTION, SOLUTION INTRAVENOUS; SUBCUTANEOUS at 14:34

## 2021-02-23 RX ADMIN — CYANOCOBALAMIN TAB 1000 MCG SCH MCG: 1000 TAB at 08:04

## 2021-02-23 RX ADMIN — LANTHANUM CARBONATE SCH MG: 500 TABLET, CHEWABLE ORAL at 18:42

## 2021-02-23 RX ADMIN — HEPARIN SODIUM SCH UNITS: 5000 INJECTION, SOLUTION INTRAVENOUS; SUBCUTANEOUS at 08:05

## 2021-02-23 RX ADMIN — HEPARIN SODIUM SCH UNITS: 5000 INJECTION, SOLUTION INTRAVENOUS; SUBCUTANEOUS at 20:47

## 2021-02-23 RX ADMIN — LANTHANUM CARBONATE SCH MG: 500 TABLET, CHEWABLE ORAL at 08:57

## 2021-02-23 NOTE — PRG
DATE OF SERVICE:  02/23/2021



SUBJECTIVE:  Ms. Russell is a 65-year-old  female with ESRD and admitted for

mental status change.  Mentation is much improved.  No new complaints today.  No

chest pain or shortness of breath.  Awaiting skilled nursing facility/rehab

placement.  The patient is scheduled for dialysis today.  No complaints of chest

pain or shortness of breath. 



OBJECTIVE:  VITAL SIGNS:  Blood pressure 109/69, heart rate 82, respiratory rate 22,

temperature 97.8, O2 saturation 99% on 2 L. 

GENERAL:  Awake, alert, obese, comfortable, not in distress. 

SKIN:  Adequate turgor. 

HEENT:  Pinkish conjunctivae.  Anicteric sclerae. 

NECK:  No neck mass.  No carotid bruits.  No JVD. 

CHEST:  No deformities. 

LUNGS:  Clear breath sounds. 

HEART:  Normal sinus rhythm.  No murmurs, gallops, or rubs. 

ABDOMEN:  Globular, soft, nontender.  No masses. 

EXTREMITIES:  No edema.  No deformities.



MEDICATIONS:  Medications of February 23, 2021, reviewed.



LABORATORY DATA:  Laboratories of February 23, 2021; white count 10.8, hemoglobin

14.2.  Sodium 136, potassium 3.9, chloride 94, carbon dioxide 26, BUN 61, creatinine

11.43, calcium 8.5. 



ASSESSMENT AND PLAN:  

1. End-stage renal disease, stable.  We will continue current hemodialysis regimen

of 3 hours.  No changes will be made with the current Tuesday, Thursday, and

Saturday dialysis regimen. 

2. Decreased mentation - much improved with adjustment of her medications.

3. Awaiting rehab placement.





Job ID:  [id number]

## 2021-02-23 NOTE — PDOC.FM
- Subjective


Subjective: 





Patient doing well this AM, No acute concerns, no acute events overnight.





- Objective


MAR Reviewed: Yes


Vital Signs & Weight: 


                             Vital Signs (12 hours)











  Temp Pulse Resp BP BP Pulse Ox


 


 02/22/21 20:14  98.8 F  84  18   130/79  92 L


 


 02/22/21 18:41   78  20   


 


 02/22/21 18:40   78  20   


 


 02/22/21 18:39  98.1 F  79  19  132/71   96








                                     Weight











Admit Weight                   122.924 kg


 


Weight                         123.3 kg











                            Most Recent Monitor Data











Heart Rate from ECG            81


 


NIBP                           133/78


 


NIBP BP-Mean                   96


 


Respiration from ECG           19


 


SpO2                           96














I&O: 


                                        











 02/21/21 02/22/21 02/23/21





 06:59 06:59 06:59


 


Intake Total  240 60


 


Balance  240 60











Result Diagrams: 


                                 02/23/21 06:20





                                 02/23/21 06:20





Phys Exam





- Physical Examination


Constitutional: NAD


Respiratory: no wheezing, no rales, no rhonchi, clear to auscultation bilateral


Cardiovascular: RRR, no significant murmur, no rub


Gastrointestinal: soft, non-tender, no distention, positive bowel sounds





Dx/Plan





- Plan


Plan: 





Patient is a 65F with PMHx of ESRD on HD (Tues/Th/Sat with Dr. Cleveland), HTN, GERD, 

Asthma, vitamin D deficiency, neuropathy, and sleep apnea (uses CPAP at night) 

admitted for:





Metabolic encephalopathy likely 2/2 acute hypercapneic respiratory failure


Obesity Hypoventilation Syndrome


RACHID


-had worsening repetitive speech and worsening tremors over the last week; 

patient has not been wearing her home O2 for the past month but has been using 

her night-time CPAP


-continue CPAP for respiratory support at night, transition to N/C during the da

ytime as tolerated


-COVID/flu negative


-ABx: treated with rocephin and vanc, Dc'd on 2/20 due to no growth on BCx and 

no clinical signs of infection


-serum drug screen negative, unable to perform UDS


-will d/c topamax for now as this can be associated with increased drowsiness 

and tremors


-started lyrica 100mg qd, which is lower than her previous dose of 100mg bid. 

Tolerated well


-consult Pulmonology, Dr. Landa & Dr. Figueroa: on 20 mg Prednisone QD- continue 

until 2/24, then decrease dose to 10 mg QD while hopefully at rehab facility





Anxiety


-will start hydroxyzine qhs


-discussed outpatient f/u at our clinic for anxiety/depression meds





Physical Deconditioning


-due to obesity and lack of activity at home- per patient


-PT/OT- recommend rehab, awaiting placement





Worsening resting tremors


-worsening over the last week, none apparent on exam this morning


-patient of Dr. Resendez, has next appointment in March


-CT/MRI of brain within the past few months, pt reports results were normal


-on primidone at home


-can consider consulting neurology if tremors worsening





Elevated troponin


-trop 0.051 > 0.058 > 0.049


-EKG demonstrates no ST changes, patient denies chest pain





ESRD on HD


-patient has HD tues/thurs/sat, had 3hrs of HD 2/16 before being transferred to 

the ED


-Consult Nephrologist, Dr. Cleveland--appreciate recs, will arrange for HD while 

inpatient


-verified Fosrenol dosing is 1000 mg TID per Dr. Cleveland


-continue other HD meds





Hx of HTN


-patient has hx of being borderline hypotensive in clinic


-takes midodrine on HD days, will continue while in hospital


-no HTN meds at this time





Neuropathy


-restart lyrica at lower dose today and monitor mental status





Asthma


-continue home medication regimen








Diet: Renal High protein


DVT ppx: heparin


CODE: Full


PCP: Jorge Luis








Dispo: Stable, admitted to medical. Pulmonology & Nephrology consulted, 

appreciate recs. Awaiting placement hopefully DC to [facility] today.





Addendum - Attending





- Attending Attestation


Date/Time: 02/23/21 4128





I personally evaluated the patient and discussed the management with Dr. Leyva.




I agree with the History, Examination, Assessment and Plan documented above with

any addition or exceptions noted below.

## 2021-02-24 RX ADMIN — LANTHANUM CARBONATE SCH MG: 500 TABLET, CHEWABLE ORAL at 12:05

## 2021-02-24 RX ADMIN — HEPARIN SODIUM SCH UNITS: 5000 INJECTION, SOLUTION INTRAVENOUS; SUBCUTANEOUS at 15:08

## 2021-02-24 RX ADMIN — MOMETASONE FUROATE AND FORMOTEROL FUMARATE DIHYDRATE SCH PUFF: 200; 5 AEROSOL RESPIRATORY (INHALATION) at 19:21

## 2021-02-24 RX ADMIN — MOMETASONE FUROATE AND FORMOTEROL FUMARATE DIHYDRATE SCH PUFF: 200; 5 AEROSOL RESPIRATORY (INHALATION) at 07:25

## 2021-02-24 RX ADMIN — LANTHANUM CARBONATE SCH MG: 500 TABLET, CHEWABLE ORAL at 17:36

## 2021-02-24 RX ADMIN — HEPARIN SODIUM SCH UNITS: 5000 INJECTION, SOLUTION INTRAVENOUS; SUBCUTANEOUS at 21:32

## 2021-02-24 RX ADMIN — LANTHANUM CARBONATE SCH MG: 500 TABLET, CHEWABLE ORAL at 09:39

## 2021-02-24 RX ADMIN — CYANOCOBALAMIN TAB 1000 MCG SCH MCG: 1000 TAB at 08:06

## 2021-02-24 RX ADMIN — HEPARIN SODIUM SCH UNITS: 5000 INJECTION, SOLUTION INTRAVENOUS; SUBCUTANEOUS at 08:07

## 2021-02-24 NOTE — PDOC.FM
- Subjective


Subjective: 





No acute events overnight, patient feeling well overall. Has a small knot on RLQ

of abdomen, mildly painful, no discharge, erythema, warmth. Near where she is 

getting SC shots. 





- Objective


MAR Reviewed: Yes


Vital Signs & Weight: 


                             Vital Signs (12 hours)











  Temp Pulse Resp BP Pulse Ox


 


 02/23/21 20:50  98.0 F  78  20  100/65  96








                                     Weight











Admit Weight                   122.924 kg


 


Weight                         123.3 kg











                            Most Recent Monitor Data











Heart Rate from ECG            81


 


NIBP                           133/78


 


NIBP BP-Mean                   96


 


Respiration from ECG           19


 


SpO2                           96














I&O: 


                                        











 02/22/21 02/23/21 02/24/21





 06:59 06:59 06:59


 


Intake Total 240 60 185


 


Balance 240 60 185











Result Diagrams: 


                                 02/23/21 06:20





                                 02/23/21 06:20





Phys Exam





- Physical Examination


Constitutional: NAD


Respiratory: no wheezing, no rales, no rhonchi


Cardiovascular: RRR, no significant murmur, no rub


Gastrointestinal: soft, non-tender, no distention


1-2 cm nodule in RLQ with purpuric area surrounding, no warmth/drainage





Dx/Plan





- Plan


Plan: 





Patient is a 65F with PMHx of ESRD on HD (Tues/Th/Sat with Dr. Cleveland), HTN, GERD, 

Asthma, vitamin D deficiency, neuropathy, and sleep apnea (uses CPAP at night) 

admitted for:





Metabolic encephalopathy likely 2/2 acute hypercapneic respiratory failure


Obesity Hypoventilation Syndrome


RACHID


-had worsening repetitive speech and worsening tremors; patient had not been 

wearing home O2 but had been using her night-time CPAP


-continue CPAP for respiratory support at night, transition to N/C during the 

daytime as tolerated


-COVID/flu negative


-ABx: treated with rocephin and vanc, Dc'd on 2/20 due to no growth on BCx and 

no clinical signs of infection


-serum drug screen negative, unable to perform UDS


-will d/c topamax for now as this can be associated with increased drowsiness 

and tremors


-started lyrica 100mg qd, which is lower than her previous dose of 100mg bid. 

Tolerated well


-consult Pulmonology, Dr. Landa & Dr. Figueroa: on 20 mg Prednisone QD- continue 

until 2/24, then decrease dose to 10 mg QD while hopefully at rehab facility





Anxiety


-will start hydroxyzine qhs


-discussed outpatient f/u at our clinic for anxiety/depression meds





Physical Deconditioning


-due to obesity and lack of activity at home- per patient


-PT/OT- recommend rehab, awaiting placement





Worsening resting tremors


-worsening over the last week, none apparent on exam this morning


-patient of Dr. Resendez, has next appointment in March


-CT/MRI of brain within the past few months, pt reports results were normal


-on primidone at home


-can consider consulting neurology if tremors worsening





Elevated troponin


-trop 0.051 > 0.058 > 0.049


-EKG demonstrates no ST changes, patient denies chest pain





ESRD on HD


-patient has HD tues/thurs/sat, had 3hrs of HD 2/16 before being transferred to 

the ED


-Consult Nephrologist, Dr. Cleveland--appreciate recs, will arrange for HD while 

inpatient


-verified Fosrenol dosing is 1000 mg TID per Dr. Cleveland


-continue other HD meds





Hx of HTN


-patient has hx of being borderline hypotensive in clinic


-takes midodrine on HD days, will continue while in hospital


-no HTN meds at this time





Neuropathy


-restart lyrica at lower dose today and monitor mental status





Asthma


-continue home medication regimen








Diet: Renal High protein


DVT ppx: heparin


CODE: Full


PCP: Jorge Luis








Dispo: Stable, admitted to medical. Pulmonology & Nephrology consulted, 

appreciate recs. Awaiting placement hopefully DC to [facility] today.





Addendum - Attending





- Attending Attestation


Date/Time: 02/24/21 1037





I personally evaluated the patient and discussed the management with Dr. Leyva.




I agree with the History, Examination, Assessment and Plan documented above with

any addition or exceptions noted below.

## 2021-02-25 RX ADMIN — LANTHANUM CARBONATE SCH: 500 TABLET, CHEWABLE ORAL at 11:01

## 2021-02-25 RX ADMIN — MOMETASONE FUROATE AND FORMOTEROL FUMARATE DIHYDRATE SCH PUFF: 200; 5 AEROSOL RESPIRATORY (INHALATION) at 19:26

## 2021-02-25 RX ADMIN — HEPARIN SODIUM SCH UNITS: 5000 INJECTION, SOLUTION INTRAVENOUS; SUBCUTANEOUS at 20:24

## 2021-02-25 RX ADMIN — LANTHANUM CARBONATE SCH MG: 500 TABLET, CHEWABLE ORAL at 12:49

## 2021-02-25 RX ADMIN — HEPARIN SODIUM SCH: 5000 INJECTION, SOLUTION INTRAVENOUS; SUBCUTANEOUS at 11:01

## 2021-02-25 RX ADMIN — MOMETASONE FUROATE AND FORMOTEROL FUMARATE DIHYDRATE SCH PUFF: 200; 5 AEROSOL RESPIRATORY (INHALATION) at 07:04

## 2021-02-25 RX ADMIN — HEPARIN SODIUM SCH UNITS: 5000 INJECTION, SOLUTION INTRAVENOUS; SUBCUTANEOUS at 15:48

## 2021-02-25 RX ADMIN — LANTHANUM CARBONATE SCH MG: 500 TABLET, CHEWABLE ORAL at 17:20

## 2021-02-25 RX ADMIN — CYANOCOBALAMIN TAB 1000 MCG SCH MCG: 1000 TAB at 12:48

## 2021-02-25 NOTE — PRG
DATE OF SERVICE:  02/25/2021



SUBJECTIVE:  Ms. Russell is a 65-year-old  female with ESRD, currently

undergoing hemodialysis.  She was initially admitted for mental status change, 
which

was secondary to her Lyrica.  This was adjusted.  Her mentation is much 
improved.

We are awaiting placement at the rehab.  No available bed yet at the present 
time,

but may be soon available-later this afternoon versus in a.m.  Due to the change
in

a bed settings, her dialysis days will now be Monday, Wednesday, Friday, 
starting

tomorrow.  She is currently undergoing 3.5-hour dialysis today.  No complaints 
of

chest pain or shortness of breath. 



OBJECTIVE:  VITAL SIGNS:  Blood pressure 100/70 heart rate 63, respiratory rate

16, temperature 98.4, O2 saturation 95%. 

GENERAL:  Awake, alert, comfortable, not in overt distress. 

SKIN:  Adequate turgor. 

HEENT:  She has a pinkish conjunctivae.  Anicteric sclerae. 

NECK:  No neck mass.  No carotid bruits.  No JVD. 

CHEST:  No deformities. 

LUNGS:  Clear breath sounds.  No wheezing.  No crackles. 

HEART:  Normal sinus rhythm.  No murmurs.  No gallops.  No rubs. 

ABDOMEN:  Globular, soft, nontender.  No masses. 

EXTREMITIES:  No edema.  No deformities.



MEDICATIONS:  February 25, 2021, was reviewed.



LABORATORY DATA:  February 23, 2021; white count 10.8, hemoglobin 14.2.  Sodium 
136,

potassium 3.9, chloride 94, carbon dioxide 26, BUN 61, creatinine 11.43, calcium

8.5. 



ASSESSMENT AND PLAN:  

1. End-stage renal disease, stable.  We will continue current hemodialysis 
regimen

of 3.5 hours.  Due to her being transferred to the rehab, her dialysis schedule 
will

be 

Monday, Wednesday, Friday, starting tomorrow.

2. Mental status change, much improved.  Adjustment to Lyrica has been done. 

3. Anemia - stable - no indication for any EPOGEN



Agree with current management.  Recheck basic met and CBC in the a.m.







Job ID:  [id number]



[facility]

## 2021-02-25 NOTE — PDOC.FM
- Subjective


Subjective: 





Patient is feeling well this AM. No acute concerns, no acute events overnight. 

Denies CP, SOB, N/V.





- Objective


MAR Reviewed: Yes


Vital Signs & Weight: 


                             Vital Signs (12 hours)











  Temp Pulse Resp BP Pulse Ox


 


 02/24/21 20:11  98.2 F  68  18  95/58 L  92 L


 


 02/24/21 19:21    16  








                                     Weight











Admit Weight                   106.14 kg


 


Weight                         123.3 kg











                            Most Recent Monitor Data











Heart Rate from ECG            81


 


NIBP                           133/78


 


NIBP BP-Mean                   96


 


Respiration from ECG           19


 


SpO2                           96














I&O: 


                                        











 02/23/21 02/24/21 02/25/21





 06:59 06:59 06:59


 


Intake Total 60 185 1400


 


Balance 60 185 1400











Result Diagrams: 


                                 02/23/21 06:20





                                 02/23/21 06:20





Phys Exam





- Physical Examination


Constitutional: NAD


Respiratory: no wheezing, no rales, no rhonchi, clear to auscultation bilateral


Cardiovascular: RRR, no significant murmur, no rub


Gastrointestinal: soft, non-tender, no distention, positive bowel sounds





Dx/Plan





- Plan


Plan: 





Patient is a 65F with PMHx of ESRD on HD (Tues/Th/Sat with Dr. Cleveland), HTN, GERD, 

Asthma, vitamin D deficiency, neuropathy, and sleep apnea (uses CPAP at night) 

admitted for:





Metabolic encephalopathy likely 2/2 acute hypercapneic respiratory failure


Obesity Hypoventilation Syndrome


RACHID


-had worsening repetitive speech and worsening tremors; patient had not been 

wearing home O2 but had been using her night-time CPAP


-continue CPAP for respiratory support at night, transition to N/C during the 

daytime as tolerated


-COVID/flu negative


-ABx: treated with rocephin and vanc, Dc'd on 2/20 due to no growth on BCx and 

no clinical signs of infection


-serum drug screen negative, unable to perform UDS


-will d/c topamax for now as this can be associated with increased drowsiness 

and tremors


-started lyrica 100mg qd, which is lower than her previous dose of 100mg bid. 

Tolerated well


-consult Pulmonology, Dr. Landa & Dr. Figueroa: Decreased to 10 mg prednisone QD 

for remainder of rehab stay





Anxiety


-will start hydroxyzine qhs


-discussed outpatient f/u at our clinic for anxiety/depression meds





Physical Deconditioning


-due to obesity and lack of activity at home- per patient


-PT/OT- recommend rehab, awaiting placement





Worsening resting tremors


-worsening over the last week, none apparent on exam this morning


-patient of Dr. Resendez, has next appointment in March


-CT/MRI of brain within the past few months, pt reports results were normal


-on primidone at home


-can consider consulting neurology if tremors worsening





Elevated troponin


-trop 0.051 > 0.058 > 0.049


-EKG demonstrates no ST changes, patient denies chest pain





ESRD on HD


-patient has HD tues/thurs/sat, had 3hrs of HD 2/16 before being transferred to 

the ED


-Consult Nephrologist, Dr. Cleveland--appreciate recs, will arrange for HD while 

inpatient


-verified Fosrenol dosing is 1000 mg TID per Dr. Cleveland


-continue other HD meds





Hx of HTN


-patient has hx of being borderline hypotensive in clinic


-takes midodrine on HD days, will continue while in hospital


-no HTN meds at this time





Neuropathy


-restart lyrica at lower dose today and monitor mental status





Asthma


-continue home medication regimen








Diet: Renal High protein


DVT ppx: heparin


CODE: Full


PCP: Jorge Luis








Dispo: Stable, admitted to medical. Pulmonology & Nephrology consulted, 

appreciate recs. Awaiting placement hopefully DC to [facility] today.








Addendum - Attending





- Attending Attestation


Date/Time: 02/25/21 7562





I personally evaluated the patient and discussed the management with Dr. Leyva


I agree with the History, Examination, Assessment and Plan documented above with

any addition or exceptions noted below.

## 2021-02-26 VITALS — DIASTOLIC BLOOD PRESSURE: 64 MMHG | TEMPERATURE: 98.1 F | SYSTOLIC BLOOD PRESSURE: 146 MMHG

## 2021-02-26 LAB
ANION GAP SERPL CALC-SCNC: 20 MMOL/L (ref 10–20)
BASOPHILS # BLD AUTO: 0.1 THOU/UL (ref 0–0.2)
BASOPHILS NFR BLD AUTO: 0.5 % (ref 0–1)
BUN SERPL-MCNC: 33 MG/DL (ref 9.8–20.1)
CALCIUM SERPL-MCNC: 9.2 MG/DL (ref 7.8–10.44)
CHLORIDE SERPL-SCNC: 96 MMOL/L (ref 98–107)
CO2 SERPL-SCNC: 26 MMOL/L (ref 23–31)
CREAT CL PREDICTED SERPL C-G-VRATE: 14 ML/MIN (ref 70–130)
EOSINOPHIL # BLD AUTO: 0.4 THOU/UL (ref 0–0.7)
EOSINOPHIL NFR BLD AUTO: 2.7 % (ref 0–10)
GLUCOSE SERPL-MCNC: 119 MG/DL (ref 80–115)
HGB BLD-MCNC: 14.6 G/DL (ref 12–16)
LYMPHOCYTES # BLD: 2.7 THOU/UL (ref 1.2–3.4)
LYMPHOCYTES NFR BLD AUTO: 20.1 % (ref 21–51)
MCH RBC QN AUTO: 32.3 PG (ref 27–31)
MCV RBC AUTO: 105 FL (ref 78–98)
MONOCYTES # BLD AUTO: 1.1 THOU/UL (ref 0.11–0.59)
MONOCYTES NFR BLD AUTO: 8.4 % (ref 0–10)
NEUTROPHILS # BLD AUTO: 9.2 THOU/UL (ref 1.4–6.5)
NEUTROPHILS NFR BLD AUTO: 68.3 % (ref 42–75)
PLATELET # BLD AUTO: 347 THOU/UL (ref 130–400)
POTASSIUM SERPL-SCNC: 3.6 MMOL/L (ref 3.5–5.1)
RBC # BLD AUTO: 4.54 MILL/UL (ref 4.2–5.4)
SODIUM SERPL-SCNC: 138 MMOL/L (ref 136–145)
WBC # BLD AUTO: 13.4 THOU/UL (ref 4.8–10.8)

## 2021-02-26 RX ADMIN — MOMETASONE FUROATE AND FORMOTEROL FUMARATE DIHYDRATE SCH PUFF: 200; 5 AEROSOL RESPIRATORY (INHALATION) at 06:37

## 2021-02-26 RX ADMIN — CYANOCOBALAMIN TAB 1000 MCG SCH MCG: 1000 TAB at 08:23

## 2021-02-26 RX ADMIN — LANTHANUM CARBONATE SCH MG: 500 TABLET, CHEWABLE ORAL at 14:34

## 2021-02-26 RX ADMIN — LANTHANUM CARBONATE SCH MG: 500 TABLET, CHEWABLE ORAL at 08:22

## 2021-02-26 RX ADMIN — HEPARIN SODIUM SCH UNITS: 5000 INJECTION, SOLUTION INTRAVENOUS; SUBCUTANEOUS at 08:26

## 2021-02-26 RX ADMIN — HEPARIN SODIUM SCH UNITS: 5000 INJECTION, SOLUTION INTRAVENOUS; SUBCUTANEOUS at 14:51

## 2021-02-26 NOTE — DIS
DATE OF ADMISSION:  02/16/2021



DATE OF DISCHARGE:  02/26/2021



ADMITTING ATTENDING:  Dr. Thomas.



DISCHARGE ATTENDING:  Dr. Eddy.



RESIDENT:  Prosper Leyva MD.



CONSULTS:  

1. Nephrology, Dr. Cleveland on 02/17/2021.

2. Pulmonology, Dr. Figueroa on 02/17/2021.



PROCEDURES:  The patient received dialysis as scheduled while in the hospital.  The

patient was switched from Tuesday, Thursday, Saturday schedule to Monday, Wednesday,

Friday in order for easy transition to rehab facility.  The patient had CT of the

brain without contrast showing no acute intracranial events.  The patient had a

chest x-ray showing prominence of the pulmonary vascularity with haziness in the

lower lung fields. 



PRIMARY DIAGNOSIS:  Metabolic encephalopathy due to acute hypercapnic respiratory

failure secondary to unintentional medication overdose likely. 



SECONDARY DIAGNOSES:  Worsening resting tremors; elevated troponin; end-stage renal

disease, on hemodialysis; hypertension; neuropathy; asthma; obstructive sleep apnea. 



DISCHARGE MEDICATIONS:  

1. Prednisone 10 mg p.o. daily while in rehab facility.  Medication can be stopped

by physician at rehab facility. 

2. Atorvastatin 80 mg p.o. at bedtime.

3. Symbicort two puffs inhaled b.i.d.

4. Calcium acetate 667 mg p.o. t.i.d.

5. Plavix 75 mg p.o. at bedtime.

6. Vitamin B12, 1000 mcg p.o. daily.

7. Flonase nasal spray, one spray each naris at bedtime.

8. DuoNeb 3 mL nebulized solution b.i.d.

9. Fosrenol 1000 mg p.o. t.i.d.

10. Claritin 10 mg p.o. daily.

11. Midodrine 5 mg p.o. q.2 days, take one tablet before dialysis and one tablet if

needed after dialysis. 

12. Singulair 10 mg p.o. at bedtime.

13. Protonix 40 mg p.o. b.i.d.

14. Lyrica 100 mg p.o. daily.

15. Primidone 100 mg p.o. b.i.d.

16. Renvela 2.4 g p.o. t.i.d.

17. Tylenol 650 mg p.o. q.4 hours p.r.n.

18. Budesonide 0.5 mg nebulized solution inhaled b.i.d.

19. Vitamin D3, 50 mcg p.o. daily.

20. Folic acid 1 mg p.o. daily.



DISCONTINUED MEDICATIONS:  Discontinue the patient's Topamax due to acute metabolic

encephalopathy and decrease the patient's dose of Lyrica. 



HOSPITAL COURSE:  The patient is a 65-year-old female, who originally presented to

the hospital from dialysis for altered mental status.  She reported for the past

week, she had increasing memory issues and worsening resting tremor.  She also

supposed to be wearing 2 L nasal cannula at home, but recently had not been wearing

it and uses CPAP at night.  The patient had recently been seen in clinic and

evaluated with a brain CT with and without contrast and MRI with and without

contrast, that showed focal areas of enhancement; however, Radiology recommended no

further imaging.  Topamax controlled the patient's headaches when she was started on

it by Dr. Smith's office.  The patient's pregabalin dose was decreased for renal

dosing and Topamax was stopped during hospital stay.  The patient's headaches were

improved and mental status improved.  The patient was stable through the remainder

of hospital stay and agreed to go to rehab.  She wanted to go to rehab to get

stronger to have less dependence on her nephew at home.  The patient was started on

a steroid taper by Pulmonology, which should be decreased during the time at rehab.

She is currently on 10 mg of prednisone daily, which should be stopped unless

further evaluation warrants continued steroid use by rehab physician.  Imaging done

at time of admission as above. 



DISPOSITION:  Stable.



DISCHARGE INSTRUCTIONS:  

1. Location:  [facility] inpatient rehab.

2. Diet:  Renal diet, high-protein, heart healthy.

3. Activity:  As tolerated with physical therapy and occupational therapy.

4. Followup:  The patient should follow up with primary care physician, [facility], Dr. Riggs within 2 weeks.  The patient should follow up with Dr. Cleveland as

scheduled for dialysis followup.  The patient should follow up with Dr. Figueroa,

Pulmonology at next scheduled appointment or as problems arise.  The patient should

follow up with Dr. Smith if headaches worsen. 







Job ID:  [id number]

## 2021-02-26 NOTE — PDOC.FM
- Subjective


Subjective: 





Patient doing well this AM, frustrated that she feels like she isn't getting 

medications on time. Otherwise no acute concerns, no acute events overnight.





- Objective


MAR Reviewed: Yes


Vital Signs & Weight: 


                             Vital Signs (12 hours)











  Temp Pulse Resp BP Pulse Ox


 


 02/26/21 06:35   79  16   93 L


 


 02/25/21 20:00      94 L


 


 02/25/21 19:36  98.7 F  71  20  117/63  94 L


 


 02/25/21 19:25   81  16   93 L








                                     Weight











Admit Weight                   106.14 kg


 


Weight                         123.3 kg











                            Most Recent Monitor Data











Heart Rate from ECG            81


 


NIBP                           133/78


 


NIBP BP-Mean                   96


 


Respiration from ECG           19


 


SpO2                           96














I&O: 


                                        











 02/25/21 02/26/21 02/27/21





 06:59 06:59 06:59


 


Intake Total 1400 590 


 


Balance 1400 590 











Result Diagrams: 


                                 02/26/21 06:37





                                 02/26/21 06:36





Phys Exam





- Physical Examination


Constitutional: NAD


Respiratory: no wheezing, no rales, no rhonchi, clear to auscultation bilateral


Cardiovascular: RRR, no significant murmur, no rub


Gastrointestinal: soft, non-tender, no distention, positive bowel sounds





Dx/Plan





- Plan


Plan: 





Patient is a 65F with PMHx of ESRD on HD (Tues/Th/Sat with Dr. Cleveland), HTN, GERD, 

Asthma, vitamin D deficiency, neuropathy, and sleep apnea (uses CPAP at night) 

admitted for:





Metabolic encephalopathy likely 2/2 acute hypercapneic respiratory failure


Obesity Hypoventilation Syndrome


RACHID


-had worsening repetitive speech and worsening tremors; patient had not been 

wearing home O2 but had been using her night-time CPAP


-continue CPAP for respiratory support at night, transition to N/C during the 

daytime as tolerated


-COVID/flu negative


-ABx: treated with rocephin and vanc, Dc'd on 2/20 due to no growth on BCx and 

no clinical signs of infection


-serum drug screen negative, unable to perform UDS


-will d/c topamax for now as this can be associated with increased drowsiness 

and tremors


-started lyrica 100mg qd, which is lower than her previous dose of 100mg bid. 

Tolerated well


-consult Pulmonology, Dr. Landa & Dr. Figueroa: Decreased to 10 mg prednisone QD 

for remainder of rehab stay





Anxiety


-will start hydroxyzine qhs


-discussed outpatient f/u at our clinic for anxiety/depression meds





Physical Deconditioning


-due to obesity and lack of activity at home- per patient


-PT/OT- recommend rehab, awaiting placement





Worsening resting tremors


-worsening over the last week, none apparent on exam this morning


-patient of Dr. Resendez, has next appointment in March


-CT/MRI of brain within the past few months, pt reports results were normal


-on primidone at home


-can consider consulting neurology if tremors worsening





Elevated troponin


-trop 0.051 > 0.058 > 0.049


-EKG demonstrates no ST changes, patient denies chest pain





ESRD on HD


-patient has HD tues/thurs/sat, had 3hrs of HD 2/16 before being transferred to 

the ED


-Consult Nephrologist, Dr. Cleveland--appreciate recs, will arrange for HD while 

inpatient


-verified Fosrenol dosing is 1000 mg TID per Dr. Cleveland


-continue other HD meds





Hx of HTN


-patient has hx of being borderline hypotensive in clinic


-takes midodrine on HD days, will continue while in hospital


-no HTN meds at this time





Neuropathy


-restart lyrica at lower dose today and monitor mental status





Asthma


-continue home medication regimen








Diet: Renal High protein


DVT ppx: heparin


CODE: Full


PCP: Jorge Luis








Dispo: Stable, admitted to medical. Pulmonology & Nephrology consulted, 

appreciate recs. Awaiting placement hopefully DC to [facility] today.





Addendum - Attending





- Attending Attestation


Date/Time: 02/26/21 3336





I personally evaluated the patient and discussed the management with Dr. Leyva.




I agree with the History, Examination, Assessment and Plan documented above with

any addition or exceptions noted below.

## 2021-07-26 ENCOUNTER — HOSPITAL ENCOUNTER (INPATIENT)
Dept: HOSPITAL 92 - ERS | Age: 66
LOS: 14 days | Discharge: HOSPICE | DRG: 207 | End: 2021-08-09
Attending: FAMILY MEDICINE | Admitting: FAMILY MEDICINE
Payer: MEDICARE

## 2021-07-26 VITALS — BODY MASS INDEX: 47.7 KG/M2

## 2021-07-26 DIAGNOSIS — Z90.49: ICD-10-CM

## 2021-07-26 DIAGNOSIS — Z90.81: ICD-10-CM

## 2021-07-26 DIAGNOSIS — T82.868A: ICD-10-CM

## 2021-07-26 DIAGNOSIS — I95.9: ICD-10-CM

## 2021-07-26 DIAGNOSIS — Z20.822: ICD-10-CM

## 2021-07-26 DIAGNOSIS — I13.2: ICD-10-CM

## 2021-07-26 DIAGNOSIS — E66.01: ICD-10-CM

## 2021-07-26 DIAGNOSIS — Z51.5: ICD-10-CM
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
## 2021-07-26 DIAGNOSIS — G93.41: ICD-10-CM

## 2021-07-26 DIAGNOSIS — E87.1: ICD-10-CM

## 2021-07-26 DIAGNOSIS — K21.9: ICD-10-CM

## 2021-07-26 DIAGNOSIS — J96.02: ICD-10-CM

## 2021-07-26 DIAGNOSIS — N18.6: ICD-10-CM

## 2021-07-26 DIAGNOSIS — E87.5: ICD-10-CM

## 2021-07-26 DIAGNOSIS — G47.33: ICD-10-CM

## 2021-07-26 DIAGNOSIS — Z88.8: ICD-10-CM

## 2021-07-26 DIAGNOSIS — Z88.1: ICD-10-CM

## 2021-07-26 DIAGNOSIS — J15.1: ICD-10-CM

## 2021-07-26 DIAGNOSIS — E78.5: ICD-10-CM

## 2021-07-26 DIAGNOSIS — I82.612: ICD-10-CM

## 2021-07-26 DIAGNOSIS — G62.9: ICD-10-CM

## 2021-07-26 DIAGNOSIS — Z91.040: ICD-10-CM

## 2021-07-26 DIAGNOSIS — J96.01: Primary | ICD-10-CM

## 2021-07-26 DIAGNOSIS — Z66: ICD-10-CM

## 2021-07-26 DIAGNOSIS — Z99.2: ICD-10-CM

## 2021-07-26 DIAGNOSIS — E87.70: ICD-10-CM

## 2021-07-26 DIAGNOSIS — Z90.89: ICD-10-CM

## 2021-07-26 DIAGNOSIS — E16.2: ICD-10-CM

## 2021-07-26 DIAGNOSIS — I50.31: ICD-10-CM

## 2021-07-26 DIAGNOSIS — J95.851: ICD-10-CM

## 2021-07-26 DIAGNOSIS — Z79.02: ICD-10-CM

## 2021-07-26 DIAGNOSIS — J45.909: ICD-10-CM

## 2021-07-26 DIAGNOSIS — Z78.1: ICD-10-CM

## 2021-07-26 DIAGNOSIS — D63.1: ICD-10-CM

## 2021-07-26 LAB
ALBUMIN SERPL BCG-MCNC: 3.7 G/DL (ref 3.4–4.8)
ALP SERPL-CCNC: 99 U/L (ref 40–110)
ALT SERPL W P-5'-P-CCNC: 9 U/L (ref 8–55)
ANALYZER IN CARDIO: (no result)
ANION GAP SERPL CALC-SCNC: 26 MMOL/L (ref 10–20)
ANION GAP SERPL CALC-SCNC: 30 MMOL/L (ref 10–20)
AST SERPL-CCNC: 18 U/L (ref 5–34)
BASE EXCESS STD BLDA CALC-SCNC: -2 MEQ/L
BILIRUB SERPL-MCNC: 0.6 MG/DL (ref 0.2–1.2)
BUN SERPL-MCNC: 100 MG/DL (ref 9.8–20.1)
BUN SERPL-MCNC: 35 MG/DL (ref 9.8–20.1)
CA-I BLDA-SCNC: 1.12 MMOL/L (ref 1.12–1.3)
CALCIUM SERPL-MCNC: 8 MG/DL (ref 7.8–10.44)
CALCIUM SERPL-MCNC: 9 MG/DL (ref 7.8–10.44)
CHLORIDE SERPL-SCNC: 97 MMOL/L (ref 98–107)
CHLORIDE SERPL-SCNC: 98 MMOL/L (ref 98–107)
CO2 SERPL-SCNC: 19 MMOL/L (ref 23–31)
CO2 SERPL-SCNC: 20 MMOL/L (ref 23–31)
CREAT CL PREDICTED SERPL C-G-VRATE: 0 ML/MIN (ref 70–130)
CREAT CL PREDICTED SERPL C-G-VRATE: 0 ML/MIN (ref 70–130)
GLOBULIN SER CALC-MCNC: 4.1 G/DL (ref 2.4–3.5)
GLUCOSE SERPL-MCNC: 90 MG/DL (ref 80–115)
GLUCOSE SERPL-MCNC: 97 MG/DL (ref 80–115)
HCO3 BLDA-SCNC: 20.9 MEQ/L (ref 22–28)
HCT VFR BLDA CALC: 47 % (ref 36–47)
HGB BLD-MCNC: 15.6 G/DL (ref 12–16)
HGB BLDA-MCNC: 16.1 G/DL (ref 12–16)
MCH RBC QN AUTO: 30.7 PG (ref 27–31)
MCV RBC AUTO: 97.6 FL (ref 78–98)
MDIFF COMPLETE?: YES
PCO2 BLDA: 31.4 MMHG (ref 35–45)
PH BLDA: 7.44 [PH] (ref 7.35–7.45)
PLATELET # BLD AUTO: 378 THOU/UL (ref 130–400)
PO2 BLDA: 189.7 MMHG (ref 80–?)
POTASSIUM BLD-SCNC: 6.76 MMOL/L (ref 3.7–5.3)
POTASSIUM SERPL-SCNC: 5.2 MMOL/L (ref 3.5–5.1)
POTASSIUM SERPL-SCNC: 8.2 MMOL/L (ref 3.5–5.1)
RBC # BLD AUTO: 5.1 MILL/UL (ref 4.2–5.4)
SODIUM SERPL-SCNC: 138 MMOL/L (ref 136–145)
SODIUM SERPL-SCNC: 139 MMOL/L (ref 136–145)
SPECIMEN DRAWN FROM PATIENT: (no result)
WBC # BLD AUTO: 11.9 THOU/UL (ref 4.8–10.8)

## 2021-07-26 PROCEDURE — 31500 INSERT EMERGENCY AIRWAY: CPT

## 2021-07-26 PROCEDURE — 96376 TX/PRO/DX INJ SAME DRUG ADON: CPT

## 2021-07-26 PROCEDURE — 90935 HEMODIALYSIS ONE EVALUATION: CPT

## 2021-07-26 PROCEDURE — 93306 TTE W/DOPPLER COMPLETE: CPT

## 2021-07-26 PROCEDURE — 96366 THER/PROPH/DIAG IV INF ADDON: CPT

## 2021-07-26 PROCEDURE — 94003 VENT MGMT INPAT SUBQ DAY: CPT

## 2021-07-26 PROCEDURE — C9113 INJ PANTOPRAZOLE SODIUM, VIA: HCPCS

## 2021-07-26 PROCEDURE — 87040 BLOOD CULTURE FOR BACTERIA: CPT

## 2021-07-26 PROCEDURE — 82140 ASSAY OF AMMONIA: CPT

## 2021-07-26 PROCEDURE — 80053 COMPREHEN METABOLIC PANEL: CPT

## 2021-07-26 PROCEDURE — U0002 COVID-19 LAB TEST NON-CDC: HCPCS

## 2021-07-26 PROCEDURE — 93005 ELECTROCARDIOGRAM TRACING: CPT

## 2021-07-26 PROCEDURE — 83880 ASSAY OF NATRIURETIC PEPTIDE: CPT

## 2021-07-26 PROCEDURE — 80048 BASIC METABOLIC PNL TOTAL CA: CPT

## 2021-07-26 PROCEDURE — 0BH17EZ INSERTION OF ENDOTRACHEAL AIRWAY INTO TRACHEA, VIA NATURAL OR ARTIFICIAL OPENING: ICD-10-PCS | Performed by: STUDENT IN AN ORGANIZED HEALTH CARE EDUCATION/TRAINING PROGRAM

## 2021-07-26 PROCEDURE — 85060 BLOOD SMEAR INTERPRETATION: CPT

## 2021-07-26 PROCEDURE — 5A1935Z RESPIRATORY VENTILATION, LESS THAN 24 CONSECUTIVE HOURS: ICD-10-PCS | Performed by: STUDENT IN AN ORGANIZED HEALTH CARE EDUCATION/TRAINING PROGRAM

## 2021-07-26 PROCEDURE — 83735 ASSAY OF MAGNESIUM: CPT

## 2021-07-26 PROCEDURE — 76937 US GUIDE VASCULAR ACCESS: CPT

## 2021-07-26 PROCEDURE — 94640 AIRWAY INHALATION TREATMENT: CPT

## 2021-07-26 PROCEDURE — 36600 WITHDRAWAL OF ARTERIAL BLOOD: CPT

## 2021-07-26 PROCEDURE — 84145 PROCALCITONIN (PCT): CPT

## 2021-07-26 PROCEDURE — 82805 BLOOD GASES W/O2 SATURATION: CPT

## 2021-07-26 PROCEDURE — 3E033XZ INTRODUCTION OF VASOPRESSOR INTO PERIPHERAL VEIN, PERCUTANEOUS APPROACH: ICD-10-PCS | Performed by: STUDENT IN AN ORGANIZED HEALTH CARE EDUCATION/TRAINING PROGRAM

## 2021-07-26 PROCEDURE — 36416 COLLJ CAPILLARY BLOOD SPEC: CPT

## 2021-07-26 PROCEDURE — 36415 COLL VENOUS BLD VENIPUNCTURE: CPT

## 2021-07-26 PROCEDURE — P9047 ALBUMIN (HUMAN), 25%, 50ML: HCPCS

## 2021-07-26 PROCEDURE — 94660 CPAP INITIATION&MGMT: CPT

## 2021-07-26 PROCEDURE — 99292 CRITICAL CARE ADDL 30 MIN: CPT

## 2021-07-26 PROCEDURE — U0005 INFEC AGEN DETEC AMPLI PROBE: HCPCS

## 2021-07-26 PROCEDURE — 93010 ELECTROCARDIOGRAM REPORT: CPT

## 2021-07-26 PROCEDURE — 87070 CULTURE OTHR SPECIMN AEROBIC: CPT

## 2021-07-26 PROCEDURE — 36901 INTRO CATH DIALYSIS CIRCUIT: CPT

## 2021-07-26 PROCEDURE — 87205 SMEAR GRAM STAIN: CPT

## 2021-07-26 PROCEDURE — S0028 INJECTION, FAMOTIDINE, 20 MG: HCPCS

## 2021-07-26 PROCEDURE — 87340 HEPATITIS B SURFACE AG IA: CPT

## 2021-07-26 PROCEDURE — G0257 UNSCHED DIALYSIS ESRD PT HOS: HCPCS

## 2021-07-26 PROCEDURE — 96375 TX/PRO/DX INJ NEW DRUG ADDON: CPT

## 2021-07-26 PROCEDURE — 71045 X-RAY EXAM CHEST 1 VIEW: CPT

## 2021-07-26 PROCEDURE — 83605 ASSAY OF LACTIC ACID: CPT

## 2021-07-26 PROCEDURE — 87186 SC STD MICRODIL/AGAR DIL: CPT

## 2021-07-26 PROCEDURE — 96368 THER/DIAG CONCURRENT INF: CPT

## 2021-07-26 PROCEDURE — 83930 ASSAY OF BLOOD OSMOLALITY: CPT

## 2021-07-26 PROCEDURE — 80202 ASSAY OF VANCOMYCIN: CPT

## 2021-07-26 PROCEDURE — 36556 INSERT NON-TUNNEL CV CATH: CPT

## 2021-07-26 PROCEDURE — 06HY33Z INSERTION OF INFUSION DEVICE INTO LOWER VEIN, PERCUTANEOUS APPROACH: ICD-10-PCS | Performed by: SURGERY

## 2021-07-26 PROCEDURE — 88185 FLOWCYTOMETRY/TC ADD-ON: CPT

## 2021-07-26 PROCEDURE — 94002 VENT MGMT INPAT INIT DAY: CPT

## 2021-07-26 PROCEDURE — 85025 COMPLETE CBC W/AUTO DIFF WBC: CPT

## 2021-07-26 PROCEDURE — 88184 FLOWCYTOMETRY/ TC 1 MARKER: CPT

## 2021-07-26 PROCEDURE — 96365 THER/PROPH/DIAG IV INF INIT: CPT

## 2021-07-26 PROCEDURE — 80170 ASSAY OF GENTAMICIN: CPT

## 2021-07-26 RX ADMIN — Medication SCH MLS: at 19:57

## 2021-07-26 RX ADMIN — HEPARIN SODIUM SCH UNITS: 5000 INJECTION, SOLUTION INTRAVENOUS; SUBCUTANEOUS at 19:57

## 2021-07-26 RX ADMIN — FAMOTIDINE SCH MG: 10 INJECTION, SOLUTION INTRAVENOUS at 19:56

## 2021-07-26 RX ADMIN — HEPARIN SODIUM SCH: 5000 INJECTION, SOLUTION INTRAVENOUS; SUBCUTANEOUS at 19:52

## 2021-07-27 LAB
ANALYZER IN CARDIO: (no result)
ANION GAP SERPL CALC-SCNC: 26 MMOL/L (ref 10–20)
BASE EXCESS STD BLDA CALC-SCNC: -6.4 MEQ/L
BASE EXCESS STD BLDA CALC-SCNC: -6.5 MEQ/L
BASE EXCESS STD BLDA CALC-SCNC: -7.4 MEQ/L
BASE EXCESS STD BLDA CALC-SCNC: -8.6 MEQ/L
BUN SERPL-MCNC: 39 MG/DL (ref 9.8–20.1)
CA-I BLDA-SCNC: 1.06 MMOL/L (ref 1.12–1.3)
CA-I BLDA-SCNC: 1.06 MMOL/L (ref 1.12–1.3)
CA-I BLDA-SCNC: 1.07 MMOL/L (ref 1.12–1.3)
CA-I BLDA-SCNC: 1.07 MMOL/L (ref 1.12–1.3)
CALCIUM SERPL-MCNC: 8.7 MG/DL (ref 7.8–10.44)
CHLORIDE SERPL-SCNC: 97 MMOL/L (ref 98–107)
CO2 SERPL-SCNC: 19 MMOL/L (ref 23–31)
CREAT CL PREDICTED SERPL C-G-VRATE: 12 ML/MIN (ref 70–130)
GLUCOSE SERPL-MCNC: 109 MG/DL (ref 80–115)
HCO3 BLDA-SCNC: 18.7 MEQ/L (ref 22–28)
HCO3 BLDA-SCNC: 20.9 MEQ/L (ref 22–28)
HCO3 BLDA-SCNC: 22.7 MEQ/L (ref 22–28)
HCO3 BLDA-SCNC: 24.5 MEQ/L (ref 22–28)
HCT VFR BLDA CALC: 42 % (ref 36–47)
HCT VFR BLDA CALC: 46 % (ref 36–47)
HGB BLD-MCNC: 15 G/DL (ref 12–16)
HGB BLDA-MCNC: 14.4 G/DL (ref 12–16)
HGB BLDA-MCNC: 15.5 G/DL (ref 12–16)
HGB BLDA-MCNC: 15.5 G/DL (ref 12–16)
HGB BLDA-MCNC: 15.8 G/DL (ref 12–16)
MCH RBC QN AUTO: 30.5 PG (ref 27–31)
MCV RBC AUTO: 97.2 FL (ref 78–98)
MDIFF COMPLETE?: YES
PCO2 BLDA: 36.2 MMHG (ref 35–45)
PCO2 BLDA: 60.6 MMHG (ref 35–45)
PCO2 BLDA: 66 MMHG (ref 35–45)
PCO2 BLDA: 75.2 MMHG (ref 35–45)
PH BLDA: 7.13 [PH] (ref 7.35–7.45)
PH BLDA: 7.16 [PH] (ref 7.35–7.45)
PH BLDA: 7.16 [PH] (ref 7.35–7.45)
PH BLDA: 7.33 [PH] (ref 7.35–7.45)
PLATELET # BLD AUTO: 296 THOU/UL (ref 130–400)
PO2 BLDA: 129.2 MMHG (ref 80–?)
PO2 BLDA: 69.7 MMHG (ref 80–?)
PO2 BLDA: 74.6 MMHG (ref 80–?)
PO2 BLDA: 78.6 MMHG (ref 80–?)
POTASSIUM BLD-SCNC: 4.23 MMOL/L (ref 3.7–5.3)
POTASSIUM BLD-SCNC: 5 MMOL/L (ref 3.7–5.3)
POTASSIUM BLD-SCNC: 5.26 MMOL/L (ref 3.7–5.3)
POTASSIUM BLD-SCNC: 5.41 MMOL/L (ref 3.7–5.3)
POTASSIUM SERPL-SCNC: 4.7 MMOL/L (ref 3.5–5.1)
RBC # BLD AUTO: 4.93 MILL/UL (ref 4.2–5.4)
SODIUM SERPL-SCNC: 137 MMOL/L (ref 136–145)
SPECIMEN DRAWN FROM PATIENT: (no result)
WBC # BLD AUTO: 15 THOU/UL (ref 4.8–10.8)

## 2021-07-27 PROCEDURE — 5A1955Z RESPIRATORY VENTILATION, GREATER THAN 96 CONSECUTIVE HOURS: ICD-10-PCS | Performed by: STUDENT IN AN ORGANIZED HEALTH CARE EDUCATION/TRAINING PROGRAM

## 2021-07-27 PROCEDURE — 0BH18EZ INSERTION OF ENDOTRACHEAL AIRWAY INTO TRACHEA, VIA NATURAL OR ARTIFICIAL OPENING ENDOSCOPIC: ICD-10-PCS | Performed by: FAMILY MEDICINE

## 2021-07-27 RX ADMIN — ALBUMIN HUMAN SCH GM: 250 SOLUTION INTRAVENOUS at 20:49

## 2021-07-27 RX ADMIN — HEPARIN SODIUM SCH UNITS: 5000 INJECTION, SOLUTION INTRAVENOUS; SUBCUTANEOUS at 12:27

## 2021-07-27 RX ADMIN — HEPARIN SODIUM SCH UNITS: 5000 INJECTION, SOLUTION INTRAVENOUS; SUBCUTANEOUS at 20:49

## 2021-07-27 RX ADMIN — Medication SCH MLS: at 05:00

## 2021-07-27 RX ADMIN — FAMOTIDINE SCH MG: 10 INJECTION, SOLUTION INTRAVENOUS at 20:49

## 2021-07-27 RX ADMIN — ALBUMIN HUMAN SCH GM: 250 SOLUTION INTRAVENOUS at 15:40

## 2021-07-27 RX ADMIN — HEPARIN SODIUM SCH UNITS: 5000 INJECTION, SOLUTION INTRAVENOUS; SUBCUTANEOUS at 15:40

## 2021-07-28 LAB
ALBUMIN SERPL BCG-MCNC: 3.8 G/DL (ref 3.4–4.8)
ALBUMIN SERPL BCG-MCNC: 4.3 G/DL (ref 3.4–4.8)
ALP SERPL-CCNC: 107 U/L (ref 40–110)
ALP SERPL-CCNC: 99 U/L (ref 40–110)
ALT SERPL W P-5'-P-CCNC: 31 U/L (ref 8–55)
ALT SERPL W P-5'-P-CCNC: 41 U/L (ref 8–55)
ANALYZER IN CARDIO: (no result)
ANALYZER IN CARDIO: (no result)
ANION GAP SERPL CALC-SCNC: 23 MMOL/L (ref 10–20)
ANION GAP SERPL CALC-SCNC: 24 MMOL/L (ref 10–20)
ANISOCYTOSIS BLD QL SMEAR: (no result) (100X)
AST SERPL-CCNC: 54 U/L (ref 5–34)
AST SERPL-CCNC: 61 U/L (ref 5–34)
BASE EXCESS STD BLDA CALC-SCNC: -7.4 MEQ/L
BASE EXCESS STD BLDA CALC-SCNC: -9.7 MEQ/L
BILIRUB SERPL-MCNC: 2.1 MG/DL (ref 0.2–1.2)
BILIRUB SERPL-MCNC: 2.2 MG/DL (ref 0.2–1.2)
BUN SERPL-MCNC: 15 MG/DL (ref 9.8–20.1)
BUN SERPL-MCNC: 30 MG/DL (ref 9.8–20.1)
CA-I BLDA-SCNC: 1 MMOL/L (ref 1.12–1.3)
CA-I BLDA-SCNC: 1.01 MMOL/L (ref 1.12–1.3)
CALCIUM SERPL-MCNC: 8.2 MG/DL (ref 7.8–10.44)
CALCIUM SERPL-MCNC: 8.8 MG/DL (ref 7.8–10.44)
CHLORIDE SERPL-SCNC: 94 MMOL/L (ref 98–107)
CHLORIDE SERPL-SCNC: 95 MMOL/L (ref 98–107)
CO2 SERPL-SCNC: 20 MMOL/L (ref 23–31)
CO2 SERPL-SCNC: 24 MMOL/L (ref 23–31)
CREAT CL PREDICTED SERPL C-G-VRATE: 17 ML/MIN (ref 70–130)
CREAT CL PREDICTED SERPL C-G-VRATE: 20 ML/MIN (ref 70–130)
GLOBULIN SER CALC-MCNC: 2.5 G/DL (ref 2.4–3.5)
GLOBULIN SER CALC-MCNC: 2.9 G/DL (ref 2.4–3.5)
GLUCOSE SERPL-MCNC: 75 MG/DL (ref 80–115)
GLUCOSE SERPL-MCNC: 83 MG/DL (ref 80–115)
HBSAG INDEX: 0.19 S/CO (ref 0–0.99)
HCO3 BLDA-SCNC: 17.5 MEQ/L (ref 22–28)
HCO3 BLDA-SCNC: 18.5 MEQ/L (ref 22–28)
HCT VFR BLDA CALC: 40 % (ref 36–47)
HCT VFR BLDA CALC: 42 % (ref 36–47)
HGB BLD-MCNC: 13.4 G/DL (ref 12–16)
HGB BLD-MCNC: 14.2 G/DL (ref 12–16)
HGB BLDA-MCNC: 13.7 G/DL (ref 12–16)
HGB BLDA-MCNC: 14.3 G/DL (ref 12–16)
MCH RBC QN AUTO: 28.9 PG (ref 27–31)
MCH RBC QN AUTO: 30.5 PG (ref 27–31)
MCV RBC AUTO: 97.1 FL (ref 78–98)
MCV RBC AUTO: 97.5 FL (ref 78–98)
MDIFF COMPLETE?: YES
MDIFF COMPLETE?: YES
PCO2 BLDA: 38.8 MMHG (ref 35–45)
PCO2 BLDA: 43 MMHG (ref 35–45)
PH BLDA: 7.23 [PH] (ref 7.35–7.45)
PH BLDA: 7.3 [PH] (ref 7.35–7.45)
PLATELET # BLD AUTO: 236 THOU/UL (ref 130–400)
PLATELET # BLD AUTO: 261 THOU/UL (ref 130–400)
PO2 BLDA: 67.6 MMHG (ref 80–?)
PO2 BLDA: 81.3 MMHG (ref 80–?)
POLYCHROMASIA BLD QL SMEAR: (no result) (100X)
POTASSIUM BLD-SCNC: 4.7 MMOL/L (ref 3.7–5.3)
POTASSIUM BLD-SCNC: 5.11 MMOL/L (ref 3.7–5.3)
POTASSIUM SERPL-SCNC: 3.9 MMOL/L (ref 3.5–5.1)
POTASSIUM SERPL-SCNC: 4.2 MMOL/L (ref 3.5–5.1)
RBC # BLD AUTO: 4.41 MILL/UL (ref 4.2–5.4)
RBC # BLD AUTO: 4.9 MILL/UL (ref 4.2–5.4)
SODIUM SERPL-SCNC: 134 MMOL/L (ref 136–145)
SODIUM SERPL-SCNC: 138 MMOL/L (ref 136–145)
SPECIMEN DRAWN FROM PATIENT: (no result)
SPECIMEN DRAWN FROM PATIENT: (no result)
WBC # BLD AUTO: 27.7 THOU/UL (ref 4.8–10.8)
WBC # BLD AUTO: 28.8 THOU/UL (ref 4.8–10.8)

## 2021-07-28 PROCEDURE — 5A1D70Z PERFORMANCE OF URINARY FILTRATION, INTERMITTENT, LESS THAN 6 HOURS PER DAY: ICD-10-PCS | Performed by: STUDENT IN AN ORGANIZED HEALTH CARE EDUCATION/TRAINING PROGRAM

## 2021-07-28 RX ADMIN — ALBUMIN HUMAN SCH GM: 250 SOLUTION INTRAVENOUS at 03:05

## 2021-07-28 RX ADMIN — ALBUMIN HUMAN SCH GM: 250 SOLUTION INTRAVENOUS at 08:30

## 2021-07-28 RX ADMIN — NOREPINEPHRINE BITARTRATE PRN MLS: 1 INJECTION INTRAVENOUS at 16:02

## 2021-07-28 RX ADMIN — HEPARIN SODIUM SCH UNITS: 5000 INJECTION, SOLUTION INTRAVENOUS; SUBCUTANEOUS at 08:30

## 2021-07-28 RX ADMIN — HEPARIN SODIUM SCH UNITS: 5000 INJECTION, SOLUTION INTRAVENOUS; SUBCUTANEOUS at 20:27

## 2021-07-28 RX ADMIN — NOREPINEPHRINE BITARTRATE PRN MLS: 1 INJECTION INTRAVENOUS at 18:45

## 2021-07-28 RX ADMIN — HEPARIN SODIUM SCH UNITS: 5000 INJECTION, SOLUTION INTRAVENOUS; SUBCUTANEOUS at 14:25

## 2021-07-28 RX ADMIN — NOREPINEPHRINE BITARTRATE PRN MLS: 1 INJECTION INTRAVENOUS at 08:28

## 2021-07-28 RX ADMIN — FAMOTIDINE SCH MG: 10 INJECTION, SOLUTION INTRAVENOUS at 20:27

## 2021-07-29 LAB
ALBUMIN SERPL BCG-MCNC: 3.1 G/DL (ref 3.4–4.8)
ALP SERPL-CCNC: 86 U/L (ref 40–110)
ALT SERPL W P-5'-P-CCNC: 24 U/L (ref 8–55)
ANALYZER IN CARDIO: (no result)
ANION GAP SERPL CALC-SCNC: 19 MMOL/L (ref 10–20)
AST SERPL-CCNC: 45 U/L (ref 5–34)
BASE EXCESS STD BLDA CALC-SCNC: -4.6 MEQ/L
BILIRUB SERPL-MCNC: 1.8 MG/DL (ref 0.2–1.2)
BUN SERPL-MCNC: 18 MG/DL (ref 9.8–20.1)
CA-I BLDA-SCNC: 1.02 MMOL/L (ref 1.12–1.3)
CALCIUM SERPL-MCNC: 7.2 MG/DL (ref 7.8–10.44)
CHLORIDE SERPL-SCNC: 95 MMOL/L (ref 98–107)
CO2 SERPL-SCNC: 21 MMOL/L (ref 23–31)
CREAT CL PREDICTED SERPL C-G-VRATE: 20 ML/MIN (ref 70–130)
GLOBULIN SER CALC-MCNC: 2.2 G/DL (ref 2.4–3.5)
GLUCOSE SERPL-MCNC: 246 MG/DL (ref 80–115)
HCO3 BLDA-SCNC: 20.4 MEQ/L (ref 22–28)
HCT VFR BLDA CALC: 41 % (ref 36–47)
HGB BLD-MCNC: 12.5 G/DL (ref 12–16)
HGB BLDA-MCNC: 14 G/DL (ref 12–16)
MAGNESIUM SERPL-MCNC: 1.4 MG/DL (ref 1.6–2.6)
MCH RBC QN AUTO: 31.1 PG (ref 27–31)
MCV RBC AUTO: 97.6 FL (ref 78–98)
MDIFF COMPLETE?: YES
PCO2 BLDA: 37.2 MMHG (ref 35–45)
PH BLDA: 7.36 [PH] (ref 7.35–7.45)
PLATELET # BLD AUTO: 229 THOU/UL (ref 130–400)
PO2 BLDA: 72.5 MMHG (ref 80–?)
POTASSIUM BLD-SCNC: 4.13 MMOL/L (ref 3.7–5.3)
POTASSIUM SERPL-SCNC: 3.7 MMOL/L (ref 3.5–5.1)
RBC # BLD AUTO: 4.03 MILL/UL (ref 4.2–5.4)
SODIUM SERPL-SCNC: 131 MMOL/L (ref 136–145)
SPECIMEN DRAWN FROM PATIENT: (no result)
VANCOMYCIN SERPL-MCNC: 27 UG/ML
VANCOMYCIN SERPL-MCNC: 31 UG/ML
WBC # BLD AUTO: 23.6 THOU/UL (ref 4.8–10.8)

## 2021-07-29 RX ADMIN — NOREPINEPHRINE BITARTRATE PRN MLS: 1 INJECTION INTRAVENOUS at 04:12

## 2021-07-29 RX ADMIN — FAMOTIDINE SCH MG: 10 INJECTION, SOLUTION INTRAVENOUS at 21:28

## 2021-07-29 RX ADMIN — HEPARIN SODIUM SCH UNITS: 5000 INJECTION, SOLUTION INTRAVENOUS; SUBCUTANEOUS at 14:23

## 2021-07-29 RX ADMIN — NOREPINEPHRINE BITARTRATE PRN MLS: 1 INJECTION INTRAVENOUS at 08:01

## 2021-07-29 RX ADMIN — HEPARIN SODIUM SCH UNITS: 5000 INJECTION, SOLUTION INTRAVENOUS; SUBCUTANEOUS at 21:23

## 2021-07-29 RX ADMIN — HEPARIN SODIUM SCH UNITS: 5000 INJECTION, SOLUTION INTRAVENOUS; SUBCUTANEOUS at 07:27

## 2021-07-30 LAB
ALBUMIN SERPL BCG-MCNC: 3 G/DL (ref 3.4–4.8)
ALP SERPL-CCNC: 100 U/L (ref 40–110)
ALT SERPL W P-5'-P-CCNC: 23 U/L (ref 8–55)
ANALYZER IN CARDIO: (no result)
ANION GAP SERPL CALC-SCNC: 17 MMOL/L (ref 10–20)
ANISOCYTOSIS BLD QL SMEAR: (no result) (100X)
AST SERPL-CCNC: 40 U/L (ref 5–34)
BASE EXCESS STD BLDA CALC-SCNC: -3.3 MEQ/L
BILIRUB SERPL-MCNC: 1.1 MG/DL (ref 0.2–1.2)
BUN SERPL-MCNC: 30 MG/DL (ref 9.8–20.1)
CA-I BLDA-SCNC: 1.13 MMOL/L (ref 1.12–1.3)
CALCIUM SERPL-MCNC: 8.9 MG/DL (ref 7.8–10.44)
CHLORIDE SERPL-SCNC: 96 MMOL/L (ref 98–107)
CO2 SERPL-SCNC: 24 MMOL/L (ref 23–31)
CREAT CL PREDICTED SERPL C-G-VRATE: 17 ML/MIN (ref 70–130)
GLOBULIN SER CALC-MCNC: 2.8 G/DL (ref 2.4–3.5)
GLUCOSE SERPL-MCNC: 145 MG/DL (ref 80–115)
HCO3 BLDA-SCNC: 21 MEQ/L (ref 22–28)
HCT VFR BLDA CALC: 41 % (ref 36–47)
HGB BLD-MCNC: 13.3 G/DL (ref 12–16)
HGB BLDA-MCNC: 13.9 G/DL (ref 12–16)
MCH RBC QN AUTO: 31.3 PG (ref 27–31)
MCV RBC AUTO: 96.1 FL (ref 78–98)
MDIFF COMPLETE?: YES
O2 A-A PPRESDIFF RESPIRATORY: 131.03 MMHG (ref 0–20)
PCO2 BLDA: 35.7 MMHG (ref 35–45)
PH BLDA: 7.39 [PH] (ref 7.35–7.45)
PLATELET # BLD AUTO: 219 THOU/UL (ref 130–400)
PO2 BLDA: 73.9 MMHG (ref 80–?)
POTASSIUM BLD-SCNC: 3.81 MMOL/L (ref 3.7–5.3)
POTASSIUM SERPL-SCNC: 4 MMOL/L (ref 3.5–5.1)
RBC # BLD AUTO: 4.25 MILL/UL (ref 4.2–5.4)
SODIUM SERPL-SCNC: 133 MMOL/L (ref 136–145)
SPECIMEN DRAWN FROM PATIENT: (no result)
WBC # BLD AUTO: 20.9 THOU/UL (ref 4.8–10.8)

## 2021-07-30 RX ADMIN — NOREPINEPHRINE BITARTRATE PRN MLS: 1 INJECTION INTRAVENOUS at 01:42

## 2021-07-30 RX ADMIN — HEPARIN SODIUM SCH UNITS: 5000 INJECTION, SOLUTION INTRAVENOUS; SUBCUTANEOUS at 08:36

## 2021-07-30 RX ADMIN — HEPARIN SODIUM SCH: 5000 INJECTION, SOLUTION INTRAVENOUS; SUBCUTANEOUS at 13:47

## 2021-07-30 RX ADMIN — NOREPINEPHRINE BITARTRATE PRN MLS: 1 INJECTION INTRAVENOUS at 21:43

## 2021-07-30 RX ADMIN — NOREPINEPHRINE BITARTRATE PRN MLS: 1 INJECTION INTRAVENOUS at 10:14

## 2021-07-30 RX ADMIN — FAMOTIDINE SCH MG: 10 INJECTION, SOLUTION INTRAVENOUS at 20:45

## 2021-07-30 RX ADMIN — HEPARIN SODIUM SCH UNITS: 5000 INJECTION, SOLUTION INTRAVENOUS; SUBCUTANEOUS at 20:45

## 2021-07-31 LAB
ALBUMIN SERPL BCG-MCNC: 3 G/DL (ref 3.4–4.8)
ALP SERPL-CCNC: 106 U/L (ref 40–110)
ALT SERPL W P-5'-P-CCNC: 17 U/L (ref 8–55)
ANALYZER IN CARDIO: (no result)
ANION GAP SERPL CALC-SCNC: 15 MMOL/L (ref 10–20)
AST SERPL-CCNC: 34 U/L (ref 5–34)
BASE EXCESS STD BLDA CALC-SCNC: -2.5 MEQ/L
BILIRUB SERPL-MCNC: 0.8 MG/DL (ref 0.2–1.2)
BUN SERPL-MCNC: 44 MG/DL (ref 9.8–20.1)
CA-I BLDA-SCNC: 1.18 MMOL/L (ref 1.12–1.3)
CALCIUM SERPL-MCNC: 9.3 MG/DL (ref 7.8–10.44)
CHLORIDE SERPL-SCNC: 97 MMOL/L (ref 98–107)
CO2 SERPL-SCNC: 25 MMOL/L (ref 23–31)
CREAT CL PREDICTED SERPL C-G-VRATE: 16 ML/MIN (ref 70–130)
GLOBULIN SER CALC-MCNC: 3 G/DL (ref 2.4–3.5)
GLUCOSE SERPL-MCNC: 125 MG/DL (ref 80–115)
HCO3 BLDA-SCNC: 20.7 MEQ/L (ref 22–28)
HCT VFR BLDA CALC: 41 % (ref 36–47)
HGB BLD-MCNC: 13.4 G/DL (ref 12–16)
HGB BLDA-MCNC: 14 G/DL (ref 12–16)
MCH RBC QN AUTO: 29.9 PG (ref 27–31)
MCV RBC AUTO: 95.9 FL (ref 78–98)
MDIFF COMPLETE?: YES
PCO2 BLDA: 31.6 MMHG (ref 35–45)
PH BLDA: 7.44 [PH] (ref 7.35–7.45)
PLATELET # BLD AUTO: 230 THOU/UL (ref 130–400)
PO2 BLDA: 70.9 MMHG (ref 80–?)
POLYCHROMASIA BLD QL SMEAR: (no result) (100X)
POTASSIUM BLD-SCNC: 3.82 MMOL/L (ref 3.7–5.3)
POTASSIUM SERPL-SCNC: 4.1 MMOL/L (ref 3.5–5.1)
RBC # BLD AUTO: 4.47 MILL/UL (ref 4.2–5.4)
SODIUM SERPL-SCNC: 133 MMOL/L (ref 136–145)
SPECIMEN DRAWN FROM PATIENT: (no result)
VANCOMYCIN SERPL-MCNC: 14.8 UG/ML
WBC # BLD AUTO: 17.4 THOU/UL (ref 4.8–10.8)

## 2021-07-31 RX ADMIN — NOREPINEPHRINE BITARTRATE PRN MLS: 1 INJECTION INTRAVENOUS at 13:38

## 2021-07-31 RX ADMIN — HEPARIN SODIUM SCH UNITS: 5000 INJECTION, SOLUTION INTRAVENOUS; SUBCUTANEOUS at 08:20

## 2021-07-31 RX ADMIN — FAMOTIDINE SCH MG: 10 INJECTION, SOLUTION INTRAVENOUS at 20:15

## 2021-07-31 RX ADMIN — HEPARIN SODIUM SCH UNITS: 5000 INJECTION, SOLUTION INTRAVENOUS; SUBCUTANEOUS at 14:53

## 2021-07-31 RX ADMIN — HEPARIN SODIUM SCH UNITS: 5000 INJECTION, SOLUTION INTRAVENOUS; SUBCUTANEOUS at 20:15

## 2021-08-01 LAB
ALBUMIN SERPL BCG-MCNC: 3 G/DL (ref 3.4–4.8)
ALP SERPL-CCNC: 90 U/L (ref 40–110)
ALT SERPL W P-5'-P-CCNC: 17 U/L (ref 8–55)
ANALYZER IN CARDIO: (no result)
ANION GAP SERPL CALC-SCNC: 16 MMOL/L (ref 10–20)
ANISOCYTOSIS BLD QL SMEAR: (no result) (100X)
AST SERPL-CCNC: 29 U/L (ref 5–34)
BASE EXCESS STD BLDA CALC-SCNC: -0.5 MEQ/L
BILIRUB SERPL-MCNC: 0.5 MG/DL (ref 0.2–1.2)
BUN SERPL-MCNC: 39 MG/DL (ref 9.8–20.1)
CA-I BLDA-SCNC: 1.26 MMOL/L (ref 1.12–1.3)
CALCIUM SERPL-MCNC: 10 MG/DL (ref 7.8–10.44)
CHLORIDE SERPL-SCNC: 96 MMOL/L (ref 98–107)
CO2 SERPL-SCNC: 24 MMOL/L (ref 23–31)
CREAT CL PREDICTED SERPL C-G-VRATE: 18 ML/MIN (ref 70–130)
GLOBULIN SER CALC-MCNC: 3.5 G/DL (ref 2.4–3.5)
GLUCOSE SERPL-MCNC: 125 MG/DL (ref 80–115)
HCO3 BLDA-SCNC: 23.5 MEQ/L (ref 22–28)
HCT VFR BLDA CALC: 41 % (ref 36–47)
HGB BLD-MCNC: 13.5 G/DL (ref 12–16)
HGB BLDA-MCNC: 14.1 G/DL (ref 12–16)
MCH RBC QN AUTO: 30.8 PG (ref 27–31)
MCV RBC AUTO: 95.8 FL (ref 78–98)
MDIFF COMPLETE?: YES
PCO2 BLDA: 36.8 MMHG (ref 35–45)
PH BLDA: 7.42 [PH] (ref 7.35–7.45)
PLATELET # BLD AUTO: 241 THOU/UL (ref 130–400)
PO2 BLDA: 69.4 MMHG (ref 80–?)
POTASSIUM BLD-SCNC: 4.38 MMOL/L (ref 3.7–5.3)
POTASSIUM SERPL-SCNC: 4.4 MMOL/L (ref 3.5–5.1)
RBC # BLD AUTO: 4.37 MILL/UL (ref 4.2–5.4)
SODIUM SERPL-SCNC: 132 MMOL/L (ref 136–145)
SPECIMEN DRAWN FROM PATIENT: (no result)
WBC # BLD AUTO: 17.2 THOU/UL (ref 4.8–10.8)

## 2021-08-01 RX ADMIN — HEPARIN SODIUM SCH UNITS: 5000 INJECTION, SOLUTION INTRAVENOUS; SUBCUTANEOUS at 21:12

## 2021-08-01 RX ADMIN — HEPARIN SODIUM SCH UNITS: 5000 INJECTION, SOLUTION INTRAVENOUS; SUBCUTANEOUS at 14:08

## 2021-08-01 RX ADMIN — HEPARIN SODIUM SCH UNITS: 5000 INJECTION, SOLUTION INTRAVENOUS; SUBCUTANEOUS at 07:58

## 2021-08-01 RX ADMIN — FAMOTIDINE SCH MG: 10 INJECTION, SOLUTION INTRAVENOUS at 21:11

## 2021-08-01 RX ADMIN — DOCUSATE SODIUM 50 MG AND SENNOSIDES 8.6 MG SCH TAB: 8.6; 5 TABLET, FILM COATED ORAL at 11:25

## 2021-08-01 RX ADMIN — DOCUSATE SODIUM 50 MG AND SENNOSIDES 8.6 MG SCH TAB: 8.6; 5 TABLET, FILM COATED ORAL at 21:12

## 2021-08-02 LAB
ALBUMIN SERPL BCG-MCNC: 3.2 G/DL (ref 3.4–4.8)
ALP SERPL-CCNC: 88 U/L (ref 40–110)
ALT SERPL W P-5'-P-CCNC: 14 U/L (ref 8–55)
ANALYZER IN CARDIO: (no result)
ANION GAP SERPL CALC-SCNC: 18 MMOL/L (ref 10–20)
AST SERPL-CCNC: 25 U/L (ref 5–34)
BASE EXCESS STD BLDA CALC-SCNC: -1.6 MEQ/L
BILIRUB SERPL-MCNC: 0.5 MG/DL (ref 0.2–1.2)
BUN SERPL-MCNC: 68 MG/DL (ref 9.8–20.1)
CA-I BLDA-SCNC: 1.3 MMOL/L (ref 1.12–1.3)
CALCIUM SERPL-MCNC: 10.2 MG/DL (ref 7.8–10.44)
CHLORIDE SERPL-SCNC: 94 MMOL/L (ref 98–107)
CO2 SERPL-SCNC: 22 MMOL/L (ref 23–31)
CREAT CL PREDICTED SERPL C-G-VRATE: 14 ML/MIN (ref 70–130)
GLOBULIN SER CALC-MCNC: 3.2 G/DL (ref 2.4–3.5)
GLUCOSE SERPL-MCNC: 119 MG/DL (ref 80–115)
HCO3 BLDA-SCNC: 24.2 MEQ/L (ref 22–28)
HCT VFR BLDA CALC: 41 % (ref 36–47)
HGB BLD-MCNC: 13.5 G/DL (ref 12–16)
HGB BLDA-MCNC: 14.1 G/DL (ref 12–16)
MCH RBC QN AUTO: 30.1 PG (ref 27–31)
MCV RBC AUTO: 95.2 FL (ref 78–98)
MDIFF COMPLETE?: YES
O2 A-A PPRESDIFF RESPIRATORY: 114.22 MMHG (ref 0–20)
PCO2 BLDA: 44.9 MMHG (ref 35–45)
PH BLDA: 7.35 [PH] (ref 7.35–7.45)
PLATELET # BLD AUTO: 295 THOU/UL (ref 130–400)
PO2 BLDA: 79.2 MMHG (ref 80–?)
POTASSIUM BLD-SCNC: 4.86 MMOL/L (ref 3.7–5.3)
POTASSIUM SERPL-SCNC: 4.9 MMOL/L (ref 3.5–5.1)
RBC # BLD AUTO: 4.5 MILL/UL (ref 4.2–5.4)
SODIUM SERPL-SCNC: 129 MMOL/L (ref 136–145)
SPECIMEN DRAWN FROM PATIENT: (no result)
VANCOMYCIN SERPL-MCNC: 15.7 UG/ML
WBC # BLD AUTO: 18 THOU/UL (ref 4.8–10.8)

## 2021-08-02 RX ADMIN — DOCUSATE SODIUM 50 MG AND SENNOSIDES 8.6 MG SCH TAB: 8.6; 5 TABLET, FILM COATED ORAL at 07:15

## 2021-08-02 RX ADMIN — FAMOTIDINE SCH MG: 10 INJECTION, SOLUTION INTRAVENOUS at 21:56

## 2021-08-02 RX ADMIN — DOCUSATE SODIUM 50 MG AND SENNOSIDES 8.6 MG SCH TAB: 8.6; 5 TABLET, FILM COATED ORAL at 21:57

## 2021-08-02 RX ADMIN — CEFEPIME HYDROCHLORIDE SCH MLS: 1 INJECTION, SOLUTION INTRAVENOUS at 16:02

## 2021-08-02 RX ADMIN — HEPARIN SODIUM SCH UNITS: 5000 INJECTION, SOLUTION INTRAVENOUS; SUBCUTANEOUS at 13:47

## 2021-08-02 RX ADMIN — HEPARIN SODIUM SCH UNITS: 5000 INJECTION, SOLUTION INTRAVENOUS; SUBCUTANEOUS at 21:56

## 2021-08-02 RX ADMIN — NOREPINEPHRINE BITARTRATE PRN MLS: 1 INJECTION INTRAVENOUS at 16:33

## 2021-08-02 RX ADMIN — HEPARIN SODIUM SCH UNITS: 5000 INJECTION, SOLUTION INTRAVENOUS; SUBCUTANEOUS at 07:15

## 2021-08-03 LAB
ANALYZER IN CARDIO: (no result)
ANION GAP SERPL CALC-SCNC: 20 MMOL/L (ref 10–20)
BASE EXCESS STD BLDA CALC-SCNC: -0.7 MEQ/L
BUN SERPL-MCNC: 78 MG/DL (ref 9.8–20.1)
CA-I BLDA-SCNC: 1.26 MMOL/L (ref 1.12–1.3)
CALCIUM SERPL-MCNC: 10.3 MG/DL (ref 7.8–10.44)
CHLORIDE SERPL-SCNC: 92 MMOL/L (ref 98–107)
CO2 SERPL-SCNC: 23 MMOL/L (ref 23–31)
CREAT CL PREDICTED SERPL C-G-VRATE: 13 ML/MIN (ref 70–130)
GLUCOSE SERPL-MCNC: 97 MG/DL (ref 80–115)
HCO3 BLDA-SCNC: 24.5 MEQ/L (ref 22–28)
HGB BLD-MCNC: 13.2 G/DL (ref 12–16)
MCH RBC QN AUTO: 30.1 PG (ref 27–31)
MCV RBC AUTO: 95.5 FL (ref 78–98)
MDIFF COMPLETE?: YES
O2 A-A PPRESDIFF RESPIRATORY: 143.38 MMHG (ref 0–20)
PCO2 BLDA: 42.3 MMHG (ref 35–45)
PH BLDA: 7.38 [PH] (ref 7.35–7.45)
PLATELET # BLD AUTO: 300 THOU/UL (ref 130–400)
PO2 BLDA: 53.3 MMHG (ref 80–?)
POTASSIUM BLD-SCNC: 4.74 MMOL/L (ref 3.7–5.3)
POTASSIUM SERPL-SCNC: 5.1 MMOL/L (ref 3.5–5.1)
RBC # BLD AUTO: 4.38 MILL/UL (ref 4.2–5.4)
SODIUM SERPL-SCNC: 130 MMOL/L (ref 136–145)
SPECIMEN DRAWN FROM PATIENT: (no result)
WBC # BLD AUTO: 18.4 THOU/UL (ref 4.8–10.8)

## 2021-08-03 RX ADMIN — CEFEPIME HYDROCHLORIDE SCH MLS: 1 INJECTION, SOLUTION INTRAVENOUS at 17:17

## 2021-08-03 RX ADMIN — HEPARIN SODIUM SCH UNITS: 5000 INJECTION, SOLUTION INTRAVENOUS; SUBCUTANEOUS at 07:28

## 2021-08-03 RX ADMIN — HEPARIN SODIUM SCH UNITS: 5000 INJECTION, SOLUTION INTRAVENOUS; SUBCUTANEOUS at 14:42

## 2021-08-03 RX ADMIN — DOCUSATE SODIUM 50 MG AND SENNOSIDES 8.6 MG SCH TAB: 8.6; 5 TABLET, FILM COATED ORAL at 07:28

## 2021-08-04 LAB
ANALYZER IN CARDIO: (no result)
ANION GAP SERPL CALC-SCNC: 16 MMOL/L (ref 10–20)
BASE EXCESS STD BLDA CALC-SCNC: -1.7 MEQ/L
BUN SERPL-MCNC: 44 MG/DL (ref 9.8–20.1)
CA-I BLDA-SCNC: 1.19 MMOL/L (ref 1.12–1.3)
CALCIUM SERPL-MCNC: 9.7 MG/DL (ref 7.8–10.44)
CHLORIDE SERPL-SCNC: 95 MMOL/L (ref 98–107)
CO2 SERPL-SCNC: 26 MMOL/L (ref 23–31)
CREAT CL PREDICTED SERPL C-G-VRATE: 20 ML/MIN (ref 70–130)
GLUCOSE SERPL-MCNC: 120 MG/DL (ref 80–115)
HCO3 BLDA-SCNC: 22.9 MEQ/L (ref 22–28)
HCT VFR BLDA CALC: 42 % (ref 36–47)
HGB BLD-MCNC: 13.9 G/DL (ref 12–16)
HGB BLDA-MCNC: 14.4 G/DL (ref 12–16)
MCH RBC QN AUTO: 30.8 PG (ref 27–31)
MCV RBC AUTO: 95.3 FL (ref 78–98)
MDIFF COMPLETE?: YES
PCO2 BLDA: 38.4 MMHG (ref 35–45)
PH BLDA: 7.39 [PH] (ref 7.35–7.45)
PLATELET # BLD AUTO: 332 THOU/UL (ref 130–400)
PO2 BLDA: 81.8 MMHG (ref 80–?)
POTASSIUM BLD-SCNC: 4.3 MMOL/L (ref 3.7–5.3)
POTASSIUM SERPL-SCNC: 4.4 MMOL/L (ref 3.5–5.1)
RBC # BLD AUTO: 4.53 MILL/UL (ref 4.2–5.4)
SODIUM SERPL-SCNC: 133 MMOL/L (ref 136–145)
SPECIMEN DRAWN FROM PATIENT: (no result)
WBC # BLD AUTO: 20.1 THOU/UL (ref 4.8–10.8)

## 2021-08-04 PROCEDURE — B51W1ZZ FLUOROSCOPY OF DIALYSIS SHUNT/FISTULA USING LOW OSMOLAR CONTRAST: ICD-10-PCS | Performed by: RADIOLOGY

## 2021-08-04 RX ADMIN — HEPARIN SODIUM SCH UNITS: 5000 INJECTION, SOLUTION INTRAVENOUS; SUBCUTANEOUS at 21:36

## 2021-08-04 RX ADMIN — DOCUSATE SODIUM 50 MG AND SENNOSIDES 8.6 MG SCH TAB: 8.6; 5 TABLET, FILM COATED ORAL at 21:36

## 2021-08-04 RX ADMIN — HEPARIN SODIUM SCH UNITS: 5000 INJECTION, SOLUTION INTRAVENOUS; SUBCUTANEOUS at 08:07

## 2021-08-04 RX ADMIN — FAMOTIDINE SCH MG: 10 INJECTION, SOLUTION INTRAVENOUS at 21:36

## 2021-08-04 RX ADMIN — FAMOTIDINE SCH MG: 10 INJECTION, SOLUTION INTRAVENOUS at 00:09

## 2021-08-04 RX ADMIN — HEPARIN SODIUM SCH UNITS: 5000 INJECTION, SOLUTION INTRAVENOUS; SUBCUTANEOUS at 00:08

## 2021-08-04 RX ADMIN — DOCUSATE SODIUM 50 MG AND SENNOSIDES 8.6 MG SCH TAB: 8.6; 5 TABLET, FILM COATED ORAL at 00:09

## 2021-08-04 RX ADMIN — DOCUSATE SODIUM 50 MG AND SENNOSIDES 8.6 MG SCH: 8.6; 5 TABLET, FILM COATED ORAL at 08:17

## 2021-08-04 RX ADMIN — CEFEPIME HYDROCHLORIDE SCH MLS: 1 INJECTION, SOLUTION INTRAVENOUS at 16:49

## 2021-08-04 RX ADMIN — HEPARIN SODIUM SCH UNITS: 5000 INJECTION, SOLUTION INTRAVENOUS; SUBCUTANEOUS at 14:57

## 2021-08-05 LAB
ANALYZER IN CARDIO: (no result)
ANION GAP SERPL CALC-SCNC: 20 MMOL/L (ref 10–20)
BASE EXCESS STD BLDA CALC-SCNC: -3.7 MEQ/L
BUN SERPL-MCNC: 85 MG/DL (ref 9.8–20.1)
CA-I BLDA-SCNC: 1.21 MMOL/L (ref 1.12–1.3)
CALCIUM SERPL-MCNC: 9.6 MG/DL (ref 7.8–10.44)
CHLORIDE SERPL-SCNC: 95 MMOL/L (ref 98–107)
CO2 SERPL-SCNC: 23 MMOL/L (ref 23–31)
CREAT CL PREDICTED SERPL C-G-VRATE: 15 ML/MIN (ref 70–130)
GLUCOSE SERPL-MCNC: 150 MG/DL (ref 80–115)
HCO3 BLDA-SCNC: 21.4 MEQ/L (ref 22–28)
HCT VFR BLDA CALC: 43 % (ref 36–47)
HGB BLD-MCNC: 14 G/DL (ref 12–16)
HGB BLDA-MCNC: 14.7 G/DL (ref 12–16)
MCH RBC QN AUTO: 29.9 PG (ref 27–31)
MCV RBC AUTO: 95.7 FL (ref 78–98)
MDIFF COMPLETE?: YES
O2 A-A PPRESDIFF RESPIRATORY: 127.55 MMHG (ref 0–20)
PCO2 BLDA: 39.2 MMHG (ref 35–45)
PH BLDA: 7.36 [PH] (ref 7.35–7.45)
PLATELET # BLD AUTO: 380 THOU/UL (ref 130–400)
PO2 BLDA: 73 MMHG (ref 80–?)
POTASSIUM BLD-SCNC: 5.24 MMOL/L (ref 3.7–5.3)
POTASSIUM SERPL-SCNC: 5 MMOL/L (ref 3.5–5.1)
RBC # BLD AUTO: 4.68 MILL/UL (ref 4.2–5.4)
SODIUM SERPL-SCNC: 133 MMOL/L (ref 136–145)
SPECIMEN DRAWN FROM PATIENT: (no result)
WBC # BLD AUTO: 21.5 THOU/UL (ref 4.8–10.8)

## 2021-08-05 RX ADMIN — DOCUSATE SODIUM 50 MG AND SENNOSIDES 8.6 MG SCH TAB: 8.6; 5 TABLET, FILM COATED ORAL at 10:04

## 2021-08-05 RX ADMIN — DOCUSATE SODIUM 50 MG AND SENNOSIDES 8.6 MG SCH TAB: 8.6; 5 TABLET, FILM COATED ORAL at 20:06

## 2021-08-05 RX ADMIN — FAMOTIDINE SCH MG: 10 INJECTION, SOLUTION INTRAVENOUS at 20:08

## 2021-08-05 RX ADMIN — HEPARIN SODIUM SCH UNITS: 5000 INJECTION, SOLUTION INTRAVENOUS; SUBCUTANEOUS at 20:08

## 2021-08-05 RX ADMIN — HEPARIN SODIUM SCH: 5000 INJECTION, SOLUTION INTRAVENOUS; SUBCUTANEOUS at 15:30

## 2021-08-05 RX ADMIN — CEFEPIME HYDROCHLORIDE SCH MLS: 1 INJECTION, SOLUTION INTRAVENOUS at 19:06

## 2021-08-05 RX ADMIN — HEPARIN SODIUM SCH UNITS: 5000 INJECTION, SOLUTION INTRAVENOUS; SUBCUTANEOUS at 10:03

## 2021-08-06 VITALS — DIASTOLIC BLOOD PRESSURE: 88 MMHG | SYSTOLIC BLOOD PRESSURE: 137 MMHG

## 2021-08-06 LAB
ANION GAP SERPL CALC-SCNC: 18 MMOL/L (ref 10–20)
BUN SERPL-MCNC: 67 MG/DL (ref 9.8–20.1)
CALCIUM SERPL-MCNC: 9.4 MG/DL (ref 7.8–10.44)
CHLORIDE SERPL-SCNC: 95 MMOL/L (ref 98–107)
CO2 SERPL-SCNC: 26 MMOL/L (ref 23–31)
CREAT CL PREDICTED SERPL C-G-VRATE: 19 ML/MIN (ref 70–130)
GLUCOSE SERPL-MCNC: 104 MG/DL (ref 80–115)
HGB BLD-MCNC: 14.1 G/DL (ref 12–16)
MCH RBC QN AUTO: 29.4 PG (ref 27–31)
MCV RBC AUTO: 94.8 FL (ref 78–98)
MDIFF COMPLETE?: YES
PLATELET # BLD AUTO: 399 THOU/UL (ref 130–400)
POTASSIUM SERPL-SCNC: 4.1 MMOL/L (ref 3.5–5.1)
RBC # BLD AUTO: 4.79 MILL/UL (ref 4.2–5.4)
SODIUM SERPL-SCNC: 135 MMOL/L (ref 136–145)
WBC # BLD AUTO: 23.9 THOU/UL (ref 4.8–10.8)

## 2021-08-06 RX ADMIN — DOCUSATE SODIUM 50 MG AND SENNOSIDES 8.6 MG SCH TAB: 8.6; 5 TABLET, FILM COATED ORAL at 20:31

## 2021-08-06 RX ADMIN — HEPARIN SODIUM SCH UNITS: 5000 INJECTION, SOLUTION INTRAVENOUS; SUBCUTANEOUS at 14:26

## 2021-08-06 RX ADMIN — HEPARIN SODIUM SCH UNITS: 5000 INJECTION, SOLUTION INTRAVENOUS; SUBCUTANEOUS at 20:31

## 2021-08-06 RX ADMIN — CEFEPIME HYDROCHLORIDE SCH: 1 INJECTION, SOLUTION INTRAVENOUS at 13:06

## 2021-08-06 RX ADMIN — Medication SCH ML: at 20:32

## 2021-08-06 RX ADMIN — DOCUSATE SODIUM 50 MG AND SENNOSIDES 8.6 MG SCH TAB: 8.6; 5 TABLET, FILM COATED ORAL at 10:14

## 2021-08-06 RX ADMIN — HEPARIN SODIUM SCH UNITS: 5000 INJECTION, SOLUTION INTRAVENOUS; SUBCUTANEOUS at 10:14

## 2021-08-07 LAB
ANALYZER IN CARDIO: (no result)
ANALYZER IN CARDIO: (no result)
ANION GAP SERPL CALC-SCNC: 19 MMOL/L (ref 10–20)
ANION GAP SERPL CALC-SCNC: 21 MMOL/L (ref 10–20)
BASE EXCESS STD BLDA CALC-SCNC: 1 MEQ/L
BASE EXCESS STD BLDV CALC-SCNC: -2.9 MEQ/L
BUN SERPL-MCNC: 60 MG/DL (ref 9.8–20.1)
BUN SERPL-MCNC: 93 MG/DL (ref 9.8–20.1)
CA-I BLDA-SCNC: 1.13 MMOL/L (ref 1.12–1.3)
CA-I BLDV-MCNC: 1.12 MMOL/L (ref 1.16–1.32)
CALCIUM SERPL-MCNC: 9.3 MG/DL (ref 7.8–10.44)
CALCIUM SERPL-MCNC: 9.5 MG/DL (ref 7.8–10.44)
CHLORIDE BLDV-SCNC: 97 MMOL/L (ref 98–106)
CHLORIDE SERPL-SCNC: 100 MMOL/L (ref 98–107)
CHLORIDE SERPL-SCNC: 96 MMOL/L (ref 98–107)
CO2 SERPL-SCNC: 23 MMOL/L (ref 23–31)
CO2 SERPL-SCNC: 23 MMOL/L (ref 23–31)
CREAT CL PREDICTED SERPL C-G-VRATE: 13 ML/MIN (ref 70–130)
CREAT CL PREDICTED SERPL C-G-VRATE: 19 ML/MIN (ref 70–130)
GLUCOSE SERPL-MCNC: 102 MG/DL (ref 80–115)
GLUCOSE SERPL-MCNC: 113 MG/DL (ref 80–115)
HCO3 BLDA-SCNC: 22.7 MEQ/L (ref 22–28)
HCO3 BLDV-SCNC: 22 MEQ/L (ref 22–28)
HCT VFR BLDA CALC: 44 % (ref 36–47)
HCT VFR BLDV CALC: 44 % (ref 36–47)
HGB BLD-MCNC: 14 G/DL (ref 12–16)
HGB BLD-MCNC: 14.5 G/DL (ref 12–16)
HGB BLD-MCNC: 15 G/DL (ref 12–16)
HGB BLDA-MCNC: 15 G/DL (ref 12–16)
HGB BLDV-MCNC: 15.1 G/DL (ref 11.7–16.1)
MCH RBC QN AUTO: 29.2 PG (ref 27–31)
MCH RBC QN AUTO: 29.4 PG (ref 27–31)
MCH RBC QN AUTO: 30.1 PG (ref 27–31)
MCV RBC AUTO: 91.8 FL (ref 78–98)
MCV RBC AUTO: 92.4 FL (ref 78–98)
MCV RBC AUTO: 93.1 FL (ref 78–98)
MDIFF COMPLETE?: YES
PCO2 BLDA: 28.6 MMHG (ref 35–45)
PH BLDA: 7.52 [PH] (ref 7.35–7.45)
PLATELET # BLD AUTO: 413 THOU/UL (ref 130–400)
PLATELET # BLD AUTO: 436 THOU/UL (ref 130–400)
PLATELET # BLD AUTO: 447 THOU/UL (ref 130–400)
PO2 BLDA: 61.6 MMHG (ref 80–?)
POLYCHROMASIA BLD QL SMEAR: (no result) (100X)
POTASSIUM BLD-SCNC: 4.07 MMOL/L (ref 3.7–5.3)
POTASSIUM BLDV-SCNC: 4.57 MMOL/L (ref 3.7–5.3)
POTASSIUM SERPL-SCNC: 4.3 MMOL/L (ref 3.5–5.1)
POTASSIUM SERPL-SCNC: 4.4 MMOL/L (ref 3.5–5.1)
RBC # BLD AUTO: 4.74 MILL/UL (ref 4.2–5.4)
RBC # BLD AUTO: 4.82 MILL/UL (ref 4.2–5.4)
RBC # BLD AUTO: 5.13 MILL/UL (ref 4.2–5.4)
SODIUM BLDV-SCNC: 135.7 MMOL/L (ref 133–146)
SODIUM SERPL-SCNC: 136 MMOL/L (ref 136–145)
SODIUM SERPL-SCNC: 138 MMOL/L (ref 136–145)
SPECIMEN DRAWN FROM PATIENT: (no result)
WBC # BLD AUTO: 23.5 THOU/UL (ref 4.8–10.8)
WBC # BLD AUTO: 23.6 THOU/UL (ref 4.8–10.8)
WBC # BLD AUTO: 29.6 THOU/UL (ref 4.8–10.8)

## 2021-08-07 PROCEDURE — 5A09457 ASSISTANCE WITH RESPIRATORY VENTILATION, 24-96 CONSECUTIVE HOURS, CONTINUOUS POSITIVE AIRWAY PRESSURE: ICD-10-PCS | Performed by: STUDENT IN AN ORGANIZED HEALTH CARE EDUCATION/TRAINING PROGRAM

## 2021-08-07 RX ADMIN — DOCUSATE SODIUM 50 MG AND SENNOSIDES 8.6 MG SCH: 8.6; 5 TABLET, FILM COATED ORAL at 19:51

## 2021-08-07 RX ADMIN — Medication SCH ML: at 09:56

## 2021-08-07 RX ADMIN — HEPARIN SODIUM SCH UNITS: 5000 INJECTION, SOLUTION INTRAVENOUS; SUBCUTANEOUS at 15:40

## 2021-08-07 RX ADMIN — Medication SCH: at 19:51

## 2021-08-07 RX ADMIN — CEFEPIME HYDROCHLORIDE SCH MLS: 1 INJECTION, SOLUTION INTRAVENOUS at 23:22

## 2021-08-07 RX ADMIN — HEPARIN SODIUM SCH UNITS: 5000 INJECTION, SOLUTION INTRAVENOUS; SUBCUTANEOUS at 20:16

## 2021-08-07 RX ADMIN — DOCUSATE SODIUM 50 MG AND SENNOSIDES 8.6 MG SCH TAB: 8.6; 5 TABLET, FILM COATED ORAL at 09:50

## 2021-08-07 RX ADMIN — HEPARIN SODIUM SCH UNITS: 5000 INJECTION, SOLUTION INTRAVENOUS; SUBCUTANEOUS at 09:51

## 2021-08-08 LAB
ANION GAP SERPL CALC-SCNC: 22 MMOL/L (ref 10–20)
BUN SERPL-MCNC: 43 MG/DL (ref 9.8–20.1)
CALCIUM SERPL-MCNC: 9.3 MG/DL (ref 7.8–10.44)
CHLORIDE SERPL-SCNC: 96 MMOL/L (ref 98–107)
CO2 SERPL-SCNC: 25 MMOL/L (ref 23–31)
CREAT CL PREDICTED SERPL C-G-VRATE: 19 ML/MIN (ref 70–130)
GLUCOSE SERPL-MCNC: 95 MG/DL (ref 80–115)
HGB BLD-MCNC: 13.9 G/DL (ref 12–16)
MCH RBC QN AUTO: 29.2 PG (ref 27–31)
MCV RBC AUTO: 93 FL (ref 78–98)
MDIFF COMPLETE?: YES
PLATELET # BLD AUTO: 428 THOU/UL (ref 130–400)
POLYCHROMASIA BLD QL SMEAR: (no result) (100X)
POTASSIUM SERPL-SCNC: 3.9 MMOL/L (ref 3.5–5.1)
RBC # BLD AUTO: 4.76 MILL/UL (ref 4.2–5.4)
SODIUM SERPL-SCNC: 139 MMOL/L (ref 136–145)
WBC # BLD AUTO: 31.5 THOU/UL (ref 4.8–10.8)

## 2021-08-08 RX ADMIN — CEFEPIME HYDROCHLORIDE SCH MLS: 1 INJECTION, SOLUTION INTRAVENOUS at 16:34

## 2021-08-08 RX ADMIN — Medication SCH ML: at 08:40

## 2021-08-08 RX ADMIN — HEPARIN SODIUM SCH UNITS: 5000 INJECTION, SOLUTION INTRAVENOUS; SUBCUTANEOUS at 15:48

## 2021-08-08 RX ADMIN — Medication SCH ML: at 21:18

## 2021-08-08 RX ADMIN — HEPARIN SODIUM SCH UNITS: 5000 INJECTION, SOLUTION INTRAVENOUS; SUBCUTANEOUS at 08:39

## 2021-08-08 RX ADMIN — HEPARIN SODIUM SCH UNITS: 5000 INJECTION, SOLUTION INTRAVENOUS; SUBCUTANEOUS at 21:18

## 2021-08-08 RX ADMIN — DOCUSATE SODIUM 50 MG AND SENNOSIDES 8.6 MG SCH: 8.6; 5 TABLET, FILM COATED ORAL at 21:17

## 2021-08-08 RX ADMIN — DOCUSATE SODIUM 50 MG AND SENNOSIDES 8.6 MG SCH: 8.6; 5 TABLET, FILM COATED ORAL at 08:33

## 2021-08-09 VITALS — TEMPERATURE: 98.7 F

## 2021-08-09 LAB
ANION GAP SERPL CALC-SCNC: 27 MMOL/L (ref 10–20)
BUN SERPL-MCNC: 79 MG/DL (ref 9.8–20.1)
CALCIUM SERPL-MCNC: 9.4 MG/DL (ref 7.8–10.44)
CHLORIDE SERPL-SCNC: 98 MMOL/L (ref 98–107)
CO2 SERPL-SCNC: 20 MMOL/L (ref 23–31)
CREAT CL PREDICTED SERPL C-G-VRATE: 12 ML/MIN (ref 70–130)
GLUCOSE SERPL-MCNC: 112 MG/DL (ref 80–115)
HGB BLD-MCNC: 13.4 G/DL (ref 12–16)
MCH RBC QN AUTO: 28.6 PG (ref 27–31)
MCV RBC AUTO: 95.1 FL (ref 78–98)
MDIFF COMPLETE?: YES
PLATELET # BLD AUTO: 447 THOU/UL (ref 130–400)
POLYCHROMASIA BLD QL SMEAR: (no result) (100X)
POTASSIUM SERPL-SCNC: 4.7 MMOL/L (ref 3.5–5.1)
RBC # BLD AUTO: 4.7 MILL/UL (ref 4.2–5.4)
SODIUM SERPL-SCNC: 140 MMOL/L (ref 136–145)
WBC # BLD AUTO: 32.9 THOU/UL (ref 4.8–10.8)

## 2021-08-09 RX ADMIN — DOCUSATE SODIUM 50 MG AND SENNOSIDES 8.6 MG SCH: 8.6; 5 TABLET, FILM COATED ORAL at 09:40

## 2021-08-09 RX ADMIN — HEPARIN SODIUM SCH: 5000 INJECTION, SOLUTION INTRAVENOUS; SUBCUTANEOUS at 15:00

## 2021-08-09 RX ADMIN — Medication SCH ML: at 09:40

## 2021-08-09 RX ADMIN — HEPARIN SODIUM SCH UNITS: 5000 INJECTION, SOLUTION INTRAVENOUS; SUBCUTANEOUS at 09:38
